# Patient Record
Sex: FEMALE | Race: WHITE | NOT HISPANIC OR LATINO | Employment: OTHER | URBAN - NONMETROPOLITAN AREA
[De-identification: names, ages, dates, MRNs, and addresses within clinical notes are randomized per-mention and may not be internally consistent; named-entity substitution may affect disease eponyms.]

---

## 2024-07-01 ENCOUNTER — HOSPITAL ENCOUNTER (EMERGENCY)
Facility: HOSPITAL | Age: 74
Discharge: HOME | End: 2024-07-01
Attending: STUDENT IN AN ORGANIZED HEALTH CARE EDUCATION/TRAINING PROGRAM | Admitting: STUDENT IN AN ORGANIZED HEALTH CARE EDUCATION/TRAINING PROGRAM
Payer: MEDICARE

## 2024-07-01 ENCOUNTER — APPOINTMENT (OUTPATIENT)
Dept: GENERAL RADIOLOGY | Facility: HOSPITAL | Age: 74
End: 2024-07-01
Payer: MEDICARE

## 2024-07-01 VITALS
OXYGEN SATURATION: 97 % | HEART RATE: 90 BPM | HEIGHT: 65 IN | BODY MASS INDEX: 44.81 KG/M2 | SYSTOLIC BLOOD PRESSURE: 136 MMHG | WEIGHT: 268.96 LBS | RESPIRATION RATE: 16 BRPM | TEMPERATURE: 98.7 F | DIASTOLIC BLOOD PRESSURE: 79 MMHG

## 2024-07-01 DIAGNOSIS — R20.2 PARESTHESIA OF LEFT ARM: Primary | ICD-10-CM

## 2024-07-01 DIAGNOSIS — I10 HYPERTENSION, UNSPECIFIED TYPE: ICD-10-CM

## 2024-07-01 DIAGNOSIS — R94.31 ABNORMAL EKG: ICD-10-CM

## 2024-07-01 LAB
ALBUMIN SERPL-MCNC: 4.2 G/DL (ref 3.5–5.2)
ALBUMIN/GLOB SERPL: 1.2 G/DL
ALP SERPL-CCNC: 123 U/L (ref 39–117)
ALT SERPL W P-5'-P-CCNC: 30 U/L (ref 1–33)
ANION GAP SERPL CALCULATED.3IONS-SCNC: 13.2 MMOL/L (ref 5–15)
AST SERPL-CCNC: 33 U/L (ref 1–32)
BASOPHILS # BLD AUTO: 0.07 10*3/MM3 (ref 0–0.2)
BASOPHILS NFR BLD AUTO: 0.8 % (ref 0–1.5)
BILIRUB SERPL-MCNC: 0.7 MG/DL (ref 0–1.2)
BUN SERPL-MCNC: 9 MG/DL (ref 8–23)
BUN/CREAT SERPL: 13.2 (ref 7–25)
CALCIUM SPEC-SCNC: 9.2 MG/DL (ref 8.6–10.5)
CHLORIDE SERPL-SCNC: 106 MMOL/L (ref 98–107)
CO2 SERPL-SCNC: 25.8 MMOL/L (ref 22–29)
CREAT SERPL-MCNC: 0.68 MG/DL (ref 0.57–1)
DEPRECATED RDW RBC AUTO: 41.9 FL (ref 37–54)
EGFRCR SERPLBLD CKD-EPI 2021: 90.4 ML/MIN/1.73
EOSINOPHIL # BLD AUTO: 0.28 10*3/MM3 (ref 0–0.4)
EOSINOPHIL NFR BLD AUTO: 3.3 % (ref 0.3–6.2)
ERYTHROCYTE [DISTWIDTH] IN BLOOD BY AUTOMATED COUNT: 14.2 % (ref 12.3–15.4)
GLOBULIN UR ELPH-MCNC: 3.5 GM/DL
GLUCOSE SERPL-MCNC: 108 MG/DL (ref 65–99)
HCT VFR BLD AUTO: 41.4 % (ref 34–46.6)
HGB BLD-MCNC: 14.2 G/DL (ref 12–15.9)
HOLD SPECIMEN: NORMAL
HOLD SPECIMEN: NORMAL
IMM GRANULOCYTES # BLD AUTO: 0.04 10*3/MM3 (ref 0–0.05)
IMM GRANULOCYTES NFR BLD AUTO: 0.5 % (ref 0–0.5)
LYMPHOCYTES # BLD AUTO: 1.58 10*3/MM3 (ref 0.7–3.1)
LYMPHOCYTES NFR BLD AUTO: 18.9 % (ref 19.6–45.3)
MAGNESIUM SERPL-MCNC: 1.9 MG/DL (ref 1.6–2.4)
MCH RBC QN AUTO: 28.1 PG (ref 26.6–33)
MCHC RBC AUTO-ENTMCNC: 34.3 G/DL (ref 31.5–35.7)
MCV RBC AUTO: 82 FL (ref 79–97)
MONOCYTES # BLD AUTO: 0.58 10*3/MM3 (ref 0.1–0.9)
MONOCYTES NFR BLD AUTO: 6.9 % (ref 5–12)
NEUTROPHILS NFR BLD AUTO: 5.83 10*3/MM3 (ref 1.7–7)
NEUTROPHILS NFR BLD AUTO: 69.6 % (ref 42.7–76)
NRBC BLD AUTO-RTO: 0 /100 WBC (ref 0–0.2)
PLATELET # BLD AUTO: 285 10*3/MM3 (ref 140–450)
PMV BLD AUTO: 9.2 FL (ref 6–12)
POTASSIUM SERPL-SCNC: 4.2 MMOL/L (ref 3.5–5.2)
PROT SERPL-MCNC: 7.7 G/DL (ref 6–8.5)
RBC # BLD AUTO: 5.05 10*6/MM3 (ref 3.77–5.28)
SODIUM SERPL-SCNC: 145 MMOL/L (ref 136–145)
TROPONIN T SERPL HS-MCNC: 17 NG/L
TROPONIN T SERPL HS-MCNC: 17 NG/L
TSH SERPL DL<=0.05 MIU/L-ACNC: 1.17 UIU/ML (ref 0.27–4.2)
WBC NRBC COR # BLD AUTO: 8.38 10*3/MM3 (ref 3.4–10.8)
WHOLE BLOOD HOLD COAG: NORMAL
WHOLE BLOOD HOLD SPECIMEN: NORMAL

## 2024-07-01 PROCEDURE — 71045 X-RAY EXAM CHEST 1 VIEW: CPT

## 2024-07-01 PROCEDURE — 84484 ASSAY OF TROPONIN QUANT: CPT | Performed by: PHYSICIAN ASSISTANT

## 2024-07-01 PROCEDURE — 36415 COLL VENOUS BLD VENIPUNCTURE: CPT

## 2024-07-01 PROCEDURE — 80053 COMPREHEN METABOLIC PANEL: CPT | Performed by: STUDENT IN AN ORGANIZED HEALTH CARE EDUCATION/TRAINING PROGRAM

## 2024-07-01 PROCEDURE — 85025 COMPLETE CBC W/AUTO DIFF WBC: CPT | Performed by: STUDENT IN AN ORGANIZED HEALTH CARE EDUCATION/TRAINING PROGRAM

## 2024-07-01 PROCEDURE — 99284 EMERGENCY DEPT VISIT MOD MDM: CPT

## 2024-07-01 PROCEDURE — 93005 ELECTROCARDIOGRAM TRACING: CPT | Performed by: STUDENT IN AN ORGANIZED HEALTH CARE EDUCATION/TRAINING PROGRAM

## 2024-07-01 PROCEDURE — 84484 ASSAY OF TROPONIN QUANT: CPT | Performed by: STUDENT IN AN ORGANIZED HEALTH CARE EDUCATION/TRAINING PROGRAM

## 2024-07-01 PROCEDURE — 83735 ASSAY OF MAGNESIUM: CPT | Performed by: STUDENT IN AN ORGANIZED HEALTH CARE EDUCATION/TRAINING PROGRAM

## 2024-07-01 PROCEDURE — 84443 ASSAY THYROID STIM HORMONE: CPT | Performed by: STUDENT IN AN ORGANIZED HEALTH CARE EDUCATION/TRAINING PROGRAM

## 2024-07-01 RX ORDER — LOSARTAN POTASSIUM 100 MG/1
100 TABLET ORAL DAILY
COMMUNITY

## 2024-07-01 RX ORDER — SODIUM CHLORIDE 0.9 % (FLUSH) 0.9 %
10 SYRINGE (ML) INJECTION AS NEEDED
Status: DISCONTINUED | OUTPATIENT
Start: 2024-07-01 | End: 2024-07-01 | Stop reason: HOSPADM

## 2024-07-01 NOTE — ED PROVIDER NOTES
Subjective:  Chief Complaint:  A Fib    History of Present Illness:  Patient is a 74-year-old female presenting to the ER with complaints of A-fib.  Patient states that she has had some numbness and tingling to her left arm and has been seeing her PCP for this.  States that she has had some issues with her blood pressure and discussed that her losartan had been decreased by a nurse that she saw at a primary care office.  She states that she thought this was cavalier to decrease this as she states she been on it for years and believes that it was either prescribed by her PCP or a cardiologist that she seen before.  States that she has seen cardiology and had stress test and workup performed previously and everything was good.  She states that she has been concerned about her blood pressure and has been calling around to different cardiologist to get an appointment.  States that her blood pressures been high at other offices but not her PCP office and they have refused to increase her dose.  States she saw a new PCP today who she states was a doctor and she states that the doctor did not want to get an EKG but she insisted.  She states that to her shock she was in A-fib.  Denies any history of A-fib.  States that she saw a cardiologist in the past because of something her brother had been diagnosed with.  States that they started her on a statin prophylactically but she quit taking that as she states her cholesterol is normal and she thought the benefits did not outweigh the risks of side effects.  She denies any symptoms at this time, appears comfortable and eating peanut butter crackers during interview.      Nurses Notes reviewed and agree, including vitals, allergies, social history and prior medical history.     REVIEW OF SYSTEMS: All systems reviewed and not pertinent unless noted.  Review of Systems   Cardiovascular:         Reports Afib diagnosed at PCP office   Neurological:  Positive for numbness (LUE).  "  All other systems reviewed and are negative.      History reviewed. No pertinent past medical history.    Allergies:    Chlorpheniramine, Sulfate, Hydrocodone, and Morphine      History reviewed. No pertinent surgical history.      Social History     Socioeconomic History    Marital status:          History reviewed. No pertinent family history.    Objective  Physical Exam:  /79 (BP Location: Left leg)   Pulse 90   Temp 98.7 °F (37.1 °C) (Oral)   Resp 16   Ht 165.1 cm (65\")   Wt 122 kg (268 lb 15.4 oz)   SpO2 97%   BMI 44.76 kg/m²      Physical Exam  Vitals and nursing note reviewed.   Constitutional:       General: She is not in acute distress.     Appearance: She is not toxic-appearing.   HENT:      Head: Normocephalic and atraumatic.      Right Ear: External ear normal.      Left Ear: External ear normal.      Nose: Nose normal.   Eyes:      Extraocular Movements: Extraocular movements intact.      Conjunctiva/sclera: Conjunctivae normal.   Cardiovascular:      Rate and Rhythm: Normal rate.   Pulmonary:      Effort: Pulmonary effort is normal. No respiratory distress.   Abdominal:      General: There is no distension.   Musculoskeletal:         General: Normal range of motion.      Cervical back: Normal range of motion and neck supple.   Skin:     General: Skin is warm and dry.   Neurological:      General: No focal deficit present.      Mental Status: She is alert and oriented to person, place, and time.   Psychiatric:         Mood and Affect: Mood normal.         Behavior: Behavior normal.         Procedures    ED Course:         Lab Results (last 24 hours)       Procedure Component Value Units Date/Time    CBC & Differential [[id number]]  (Abnormal) Collected: 07/01/24 1405    Specimen: Blood Updated: 07/01/24 1411    Narrative:      The following orders were created for panel order CBC & Differential.  Procedure                               Abnormality         Status                   "   ---------                               -----------         ------                     CBC Auto Differential[[id number]]        Abnormal            Final result                 Please view results for these tests on the individual orders.    Comprehensive Metabolic Panel [[id number]]  (Abnormal) Collected: 07/01/24 1405    Specimen: Blood Updated: 07/01/24 1442     Glucose 108 mg/dL      BUN 9 mg/dL      Creatinine 0.68 mg/dL      Sodium 145 mmol/L      Potassium 4.2 mmol/L      Chloride 106 mmol/L      CO2 25.8 mmol/L      Calcium 9.2 mg/dL      Total Protein 7.7 g/dL      Albumin 4.2 g/dL      ALT (SGPT) 30 U/L      AST (SGOT) 33 U/L      Alkaline Phosphatase 123 U/L      Total Bilirubin 0.7 mg/dL      Globulin 3.5 gm/dL      A/G Ratio 1.2 g/dL      BUN/Creatinine Ratio 13.2     Anion Gap 13.2 mmol/L      eGFR 90.4 mL/min/1.73     Narrative:      GFR Normal >60  Chronic Kidney Disease <60  Kidney Failure <15    The GFR formula is only valid for adults with stable renal function between ages 18 and 70.    Magnesium [[id number]]  (Normal) Collected: 07/01/24 1405    Specimen: Blood Updated: 07/01/24 1442     Magnesium 1.9 mg/dL     Single High Sensitivity Troponin T [[id number]]  (Abnormal) Collected: 07/01/24 1405    Specimen: Blood Updated: 07/01/24 1455     HS Troponin T 17 ng/L     Narrative:      High Sensitive Troponin T Reference Range:  <14.0 ng/L- Negative Female for AMI  <22.0 ng/L- Negative Male for AMI  >=14 - Abnormal Female indicating possible myocardial injury.  >=22 - Abnormal Male indicating possible myocardial injury.   Clinicians would have to utilize clinical acumen, EKG, Troponin, and serial changes to determine if it is an Acute Myocardial Infarction or myocardial injury due to an underlying chronic condition.         TSH [[id number]]  (Normal) Collected: 07/01/24 1405    Specimen: Blood Updated: 07/01/24 1455     TSH 1.170 uIU/mL     CBC Auto Differential [[id number]]  (Abnormal) Collected:  07/01/24 1405    Specimen: Blood Updated: 07/01/24 1411     WBC 8.38 10*3/mm3      RBC 5.05 10*6/mm3      Hemoglobin 14.2 g/dL      Hematocrit 41.4 %      MCV 82.0 fL      MCH 28.1 pg      MCHC 34.3 g/dL      RDW 14.2 %      RDW-SD 41.9 fl      MPV 9.2 fL      Platelets 285 10*3/mm3      Neutrophil % 69.6 %      Lymphocyte % 18.9 %      Monocyte % 6.9 %      Eosinophil % 3.3 %      Basophil % 0.8 %      Immature Grans % 0.5 %      Neutrophils, Absolute 5.83 10*3/mm3      Lymphocytes, Absolute 1.58 10*3/mm3      Monocytes, Absolute 0.58 10*3/mm3      Eosinophils, Absolute 0.28 10*3/mm3      Basophils, Absolute 0.07 10*3/mm3      Immature Grans, Absolute 0.04 10*3/mm3      nRBC 0.0 /100 WBC     Single High Sensitivity Troponin T [[id number]]  (Abnormal) Collected: 07/01/24 1559    Specimen: Blood Updated: 07/01/24 1625     HS Troponin T 17 ng/L     Narrative:      High Sensitive Troponin T Reference Range:  <14.0 ng/L- Negative Female for AMI  <22.0 ng/L- Negative Male for AMI  >=14 - Abnormal Female indicating possible myocardial injury.  >=22 - Abnormal Male indicating possible myocardial injury.   Clinicians would have to utilize clinical acumen, EKG, Troponin, and serial changes to determine if it is an Acute Myocardial Infarction or myocardial injury due to an underlying chronic condition.                  XR Chest 1 View    Result Date: 7/1/2024  PROCEDURE: XR CHEST 1 VW-  HISTORY: Dysrhythmia Triage Protocol  COMPARISON: None.  FINDINGS: Apical lordotic positioning. The heart is mildly enlarged. The thoracic aorta is tortuous. The mediastinum is unremarkable. There is mild elevation of the right hemidiaphragm. There is no pneumothorax. There are no acute osseous abnormalities.      Impression: Cardiomegaly without acute infiltrate.     Images were reviewed, interpreted, and dictated by Dr. Alicia Parnell MD Transcribed by Victor M Harris PA-C.  This report was signed and finalized on 7/1/2024 2:57 PM by Alicia Parnell  MD.          ALVA  Patient evaluated in the ER for A-fib discovered at PCPs office when she insisted that the PCP ordered an EKG.  She has been having some left upper extremity numbness/tingling.  She is hypertensive but otherwise hemodynamically stable, afebrile, nontoxic-appearing on exam.  Differential diagnosis includes but is not limited to A-fib, ACS, other cardiac arrhythmia, electrolyte abnormalities, among others.  Initial plan includes CBC, CMP, magnesium, troponin, TSH, EKG, chest x-ray.    Labs are nonactionable.  Troponin stable at 17.  EKG normal sinus rhythm with no acute ischemic changes.  Reviewed EKGs from PCP office with ED attending. Questionable A Fib. Not high quality EKG. Chest x-ray does show cardiomegaly without any acute infiltrates.  Discussed with patient that workup in ER is reassuring but this does not rule out paroxysmal arrhythmia or CHF or an arrhythmia.  Advised the patient that she would benefit from further cardiology workup and evaluation such as Holter monitor or echo.  Referral was placed for Dr. Harkins, cardiologist.  Recommended keeping a blood pressure log as well and following up with PCP for further outpatient evaluation.  Precautions were given for return to the ER for any new or worsening symptoms.    Final diagnoses:   Paresthesia of left arm   Hypertension, unspecified type   Abnormal EKG          Allyson Maddox PA-C  07/01/24 6025

## 2024-07-01 NOTE — DISCHARGE INSTRUCTIONS
Your troponins (cardiac enzymes) were stable at 17. EKG was sinus rhythm here with no acute ischemic changes. Your chest X-ray did show cardiomegaly. Referral has been placed for Dr. Harkins, Cardiologist.  Follow-up with him for further outpatient evaluation and definitive care of abnormal EKG at PCP's office and hypertension.  Return to the ER for new or worsening symptoms or acute concerns.

## 2024-07-02 ENCOUNTER — OFFICE VISIT (OUTPATIENT)
Dept: CARDIOLOGY | Facility: CLINIC | Age: 74
End: 2024-07-02
Payer: MEDICARE

## 2024-07-02 VITALS
HEART RATE: 78 BPM | BODY MASS INDEX: 44.65 KG/M2 | WEIGHT: 268 LBS | OXYGEN SATURATION: 96 % | DIASTOLIC BLOOD PRESSURE: 82 MMHG | HEIGHT: 65 IN | RESPIRATION RATE: 18 BRPM | SYSTOLIC BLOOD PRESSURE: 132 MMHG

## 2024-07-02 DIAGNOSIS — I48.91 ATRIAL FIBRILLATION, UNSPECIFIED TYPE: ICD-10-CM

## 2024-07-02 DIAGNOSIS — E66.01 MORBID OBESITY WITH BMI OF 40.0-44.9, ADULT: ICD-10-CM

## 2024-07-02 DIAGNOSIS — L97.909 CHRONIC CUTANEOUS VENOUS STASIS ULCER: ICD-10-CM

## 2024-07-02 DIAGNOSIS — I10 ESSENTIAL HYPERTENSION: Primary | ICD-10-CM

## 2024-07-02 DIAGNOSIS — I83.009 CHRONIC CUTANEOUS VENOUS STASIS ULCER: ICD-10-CM

## 2024-07-02 DIAGNOSIS — R94.31 ABNORMAL EKG: ICD-10-CM

## 2024-07-02 RX ORDER — LEVOTHYROXINE SODIUM 0.05 MG/1
50 TABLET ORAL DAILY
COMMUNITY

## 2024-07-02 RX ORDER — EVENING PRIMROSE OIL 500 MG
100 CAPSULE ORAL DAILY
COMMUNITY

## 2024-07-02 RX ORDER — MULTIVITAMIN
1 CAPSULE ORAL DAILY
COMMUNITY

## 2024-07-02 RX ORDER — ACYCLOVIR 200 MG/1
200 CAPSULE ORAL 2 TIMES DAILY
COMMUNITY

## 2024-07-02 RX ORDER — HYDRALAZINE HYDROCHLORIDE 10 MG/1
10 TABLET, FILM COATED ORAL 2 TIMES DAILY
COMMUNITY
Start: 2024-04-26

## 2024-07-02 NOTE — PROGRESS NOTES
[facility] Cardiology    Office Consult     Gloria D Shone  [id number]  07/02/2024    Referred By: No ref. provider found    Chief Complaint   Patient presents with    Atrial Fibrillation    Left arm numbness       Subjective     History of Present Illness:   Gloria D Shone is a 74 y.o. female who presents to the Cardiology Clinic for evaluation after an emergency department visit yesterday evening.  Patient presented to the emergency department after seeing her PCP who obtained an EKG noting atrial fibrillation per her report.  Patient with past health history significant for hypertension, venous insufficiency and venous ulcers, and lymphedema.  At time of exam today, patient tells me that her Losartan dose was previously 200mg daily and her PCP cut that back to 100mg daily even though her cardiologist had prescribed it in the past.  Patient states that her blood pressure had been high since then.  She was recently started on Hydralazine 10mg BID in addition to Losartan 100mg in April and states she does not think it is doing anything.  She is followed by a wound clinic in [place] for venous stasis ulcers to right leg and states her blood pressure has been high at those visits in the 170s systolic.  She notes that she does have nerve damage and it is painful when she checks her blood pressure.  I discussed with her that her sleep doctor appointment in March the blood pressure was documented at 120/64 and she advised it was high at that appointment.  Her blood pressure in the emergency department yesterday and in the office today was in 130s/80s.  We discussed that she would need to check her blood pressures a couple times/ week and keep a log to bring back to follow up prior to any medication adjustments.  She states that she asked for an EKG in the PCP office due to her high blood pressure and numbness in bilateral arms (which has been documented to be followed by her PCP) which she suspected  was putting her in atrial fibrillation.  She states her PCP told her she was in atrial fibrillation and she needed to go to the ED.  She denies any chest pain, shortness of breath, or palpitations at any time.  She has chronic lower extremity edema due to venous stasis and lymphedema.      Past Cardiac Testing: None on file      Review of Systems   Constitutional:  Negative for activity change and fatigue.   Respiratory:  Negative for chest tightness and shortness of breath.    Cardiovascular:  Positive for leg swelling. Negative for chest pain and palpitations.   Neurological:  Negative for dizziness.   All other systems reviewed and are negative.       Past Medical History:   Diagnosis Date    Breast cancer     Hypertension     Hypothyroidism     Sleep apnea        Past Surgical History:   Procedure Laterality Date    BREAST LUMPECTOMY      CERVICAL FUSION      CHOLECYSTECTOMY      FINGER TENDON REPAIR      HYSTERECTOMY      REPLACEMENT TOTAL KNEE BILATERAL      TOE TENDON REPAIR      TONSILLECTOMY      TOTAL HIP ARTHROPLASTY         Family History   Problem Relation Age of Onset    Kidney failure Mother     Heart disease Father     Heart attack Father     Heart attack Paternal Uncle     Heart attack Maternal Grandfather     Heart attack Paternal Grandfather        Social History     Socioeconomic History    Marital status:    Tobacco Use    Smoking status: Never   Vaping Use    Vaping status: Never Used   Substance and Sexual Activity    Alcohol use: Not Currently     Comment: social    Drug use: Never    Sexual activity: Defer         Current Outpatient Medications:     acyclovir (ZOVIRAX) 200 MG capsule, Take 1 capsule by mouth 2 (Two) Times a Day., Disp: , Rfl:     CALCIUM PO, Take  by mouth., Disp: , Rfl:     Coenzyme Q10 (COQ-10 PO), Take  by mouth., Disp: , Rfl:     hydrALAZINE (APRESOLINE) 10 MG tablet, Take 1 tablet by mouth 2 (Two) Times a Day., Disp: , Rfl:     levothyroxine (SYNTHROID,  "LEVOTHROID) 50 MCG tablet, Take 1 tablet by mouth Daily., Disp: , Rfl:     losartan (COZAAR) 100 MG tablet, Take 1 tablet by mouth Daily., Disp: , Rfl:     MAGNESIUM ASPARTATE PO, Take  by mouth., Disp: , Rfl:     Multiple Vitamin (multivitamin) capsule, Take 1 capsule by mouth Daily., Disp: , Rfl:     Multiple Vitamins-Minerals (b complex-C-E-zinc) tablet, Take 1 tablet by mouth Daily., Disp: , Rfl:     mupirocin (BACTROBAN) 2 % ointment, Apply 1 Application topically to the appropriate area as directed., Disp: , Rfl:     Vitamin E 45 MG (100 UNIT) capsule, Take 1 capsule by mouth Daily., Disp: , Rfl:   No current facility-administered medications for this visit.      Allergies   Allergen Reactions    Chlorpheniramine Hives and Other (See Comments)    Sulfa Antibiotics Nausea Only     Other reaction(s): Nausea and/or Vomiting   vomiting   reviewed in [facility]   GI UPSET    Sulfate Nausea Only     Other reaction(s): Nausea and/or Vomiting   vomiting   reviewed in [facility]   Other reaction(s): Nausea and/or Vomiting   vomiting   reviewed in [facility]   GI UPSET    GI UPSET    Other reaction(s): Nausea and/or Vomiting   vomiting   reviewed in [facility]   GI UPSET     Other reaction(s): Nausea and/or Vomiting   vomiting   reviewed in [facility]   GI UPSET    Hydrocodone Cough    Morphine Nausea And Vomiting and Other (See Comments)     [HC:2003-03-27]   Other reaction(s): Nausea and/or Vomiting   [HC:2003-03-27]    Other reaction(s): Nausea and/or Vomiting   [HC:2003-03-27]       Objective     Vitals:    07/02/24 1057   BP: 132/82   BP Location: Right arm   Patient Position: Sitting   Cuff Size: Adult   Pulse: 78   Resp: 18   SpO2: 96%   Weight: 122 kg (268 lb)   Height: 165.1 cm (65\")     Body mass index is 44.6 kg/m².    Physical Exam  Vitals and nursing note reviewed.   Constitutional:       General: She is not in acute distress.     Appearance: Normal appearance. She is obese. She is not toxic-appearing.   HENT:      Head: Normocephalic.      " Mouth/Throat:      Mouth: Mucous membranes are moist.   Eyes:      Pupils: Pupils are equal, round, and reactive to light.   Cardiovascular:      Rate and Rhythm: Normal rate and regular rhythm.      Pulses: Normal pulses.      Heart sounds: Normal heart sounds. No murmur heard.  Pulmonary:      Effort: Pulmonary effort is normal.      Breath sounds: Normal breath sounds. No wheezing, rhonchi or rales.   Musculoskeletal:      Right lower leg: Edema present.      Left lower leg: Edema present.      Comments: Chronic lower extremity edema  Right leg wrapped with venous stasis ulcer currently being treated   Skin:     General: Skin is warm and dry.      Capillary Refill: Capillary refill takes less than 2 seconds.   Neurological:      Mental Status: She is alert and oriented to person, place, and time. Mental status is at baseline.   Psychiatric:         Mood and Affect: Mood normal.         Behavior: Behavior normal.         Thought Content: Thought content normal.         Results Review:   I reviewed the patient's new clinical results.  I reviewed the patient's other test results and agree with the interpretation      ECG 12 Lead    Date/Time: 7/2/2024 12:34 PM  Performed by: Martha Khan APRN    Authorized by: Martha Khan APRN  Comparison: compared with previous ECG from 7/1/2024  Similar to previous ECG  Rhythm: sinus rhythm and sinus arrhythmia  Rate: normal    Clinical impression: normal ECG          Assessment & Plan  Abnormal EKG  -Requesting records from PCP office including EKG which patient was told read atrial fibrillation.  -EKG in office today normal sinus rhythm with sinus arrhythmia.  -As patient is asymptomatic, will place MCOT to assess for possible atrial fibrillation for 30 days.    Essential hypertension  -Currently on Losartan 100mg and Hydralazine 10mg BID  -Blood pressure controlled today  -Discussed keeping blood pressure log to bring to follow up and to call the office if blood  pressures consistently run >150 systolic and we would consider uptitrating Hydralazine at that time.  -Requesting wound clinic visits for blood pressure readings as well    Morbid obesity with BMI of 40.0-44.9, adult  -Diet and exercise recommended for weight loss    Atrial fibrillation, unspecified type  -Was told she was in Atrial Fibrillation by PCP office.  Records requested.  ED noted questionable atrial fibrillation on their review.  -MCOT to assess for atrial fibrillation for 30 days  -Echo ordered to assess for structural abnormalities and systolic/ diastolic function    Chronic cutaneous venous stasis ulcer  -Being followed by wound clinic      Orders Placed This Encounter   Procedures    Mobile Cardiac Outpatient Telemetry    Adult Transthoracic Echo Complete W/ Cont if Necessary Per Protocol          Preventative Cardiology:   Tobacco Cessation: N/A   Advance Care Planning: ACP discussion was held with the patient during this visit. Patient does not have an advance directive, declines further assistance.     Follow Up:   Return in about 6 weeks (around 8/13/2024) for discussion of test results and reassess blood pressure.      Thank you for allowing me to participate in the care of your patient. Please to not hesitate to contact me with additional questions or concerns.     MORA Montes

## 2024-08-05 ENCOUNTER — TELEPHONE (OUTPATIENT)
Dept: CARDIOLOGY | Facility: CLINIC | Age: 74
End: 2024-08-05

## 2024-08-05 NOTE — TELEPHONE ENCOUNTER
Hub staff attempted to follow warm transfer process and was unsuccessful     Caller: Shone, Gloria D    Relationship to patient: Self    Best call back number: [phone]     Patient is needing: PT STATES SHE MISSED A CALL, NO NOTE IN CHART BUT APPT NOTES WERE CHANGED ON 8/5.     STATES IF THIS TEST IS CAN SHE WILL NOT BE ABLE TO COME IN UNTIL NEXT YEAR, PLEASE ADVISE IF THIS IS SOMETHING SHE CAN KEEP ON THIS DATE. PT HAS APPT ON 8/15 WITH CARD FOR TESTING F/U

## 2024-08-09 ENCOUNTER — TELEPHONE (OUTPATIENT)
Dept: CARDIOLOGY | Facility: CLINIC | Age: 74
End: 2024-08-09

## 2024-08-09 NOTE — TELEPHONE ENCOUNTER
Caller: Shone, Gloria D    Relationship: Self    Best call back number: [phone]     What is the best time to reach you: ASAP     What was the call regarding: PT IS NEEDING A NUMBER FOR THE COMPANY THAT DID HER HEART MONITOR, STATES SOMETHING HAS HAPPENED IN MAILING PROCESS. STATES THIS IS URGENT. WILL BE CALLING BACK IN 10 MINS IF NO ONE CALLS HER.     Is it okay if the provider responds through MyChart: CALL

## 2024-08-15 ENCOUNTER — OFFICE VISIT (OUTPATIENT)
Dept: CARDIOLOGY | Facility: CLINIC | Age: 74
End: 2024-08-15
Payer: MEDICARE

## 2024-08-15 VITALS
OXYGEN SATURATION: 95 % | DIASTOLIC BLOOD PRESSURE: 70 MMHG | HEIGHT: 65 IN | BODY MASS INDEX: 44.65 KG/M2 | WEIGHT: 268 LBS | SYSTOLIC BLOOD PRESSURE: 120 MMHG | HEART RATE: 72 BPM

## 2024-08-15 DIAGNOSIS — E66.01 MORBID OBESITY WITH BMI OF 40.0-44.9, ADULT: ICD-10-CM

## 2024-08-15 DIAGNOSIS — R94.31 ABNORMAL EKG: ICD-10-CM

## 2024-08-15 DIAGNOSIS — I10 ESSENTIAL HYPERTENSION: Primary | ICD-10-CM

## 2024-08-15 PROCEDURE — 99213 OFFICE O/P EST LOW 20 MIN: CPT | Performed by: INTERNAL MEDICINE

## 2024-08-15 NOTE — PROGRESS NOTES
[facility] Cardiology Office Follow Up Note    Gloria D Shone  [id number]  08/15/2024    Primary Care Provider: Mohsen Colunga MD    Chief Complaint: Routine follow-up    History of Present Illness:   Mrs. Gloria D Shone is a 74 y.o. female who presents to the Cardiology Clinic for continued follow-up.  The patient initially presented to cardiology clinic for evaluation of potential atrial fibrillation noted on prior ECG in .  On review of the ECG, there was no definitive evidence of atrial fibrillation.  She subsequently underwent outpatient cardiac monitoring, with no evidence of PAF.  Today, the patient reports he continues to do well from a cardiac standpoint.  She has not had any episodes of palpitations.  No chest pain or chest discomfort.  She does report her blood pressure is continuously fluctuating.  No other specific complaints today.    Past Cardiac Testin. Last Coronary Angio: None  2. Prior Stress Testing: None  3. Last Echo: 2024  1.  Normal left ventricular size and systolic function, LVEF 60-65%.  2.  Normal LV diastolic filling pattern.  3.  Normal right ventricular size and systolic function.  4.  Normal left atrial volume index.  5.  No significant valvular abnormalities.  4. Prior Holter Monitor: [facility] [place]    1.  No evidence of atrial fibrillation    Review of Systems:   Review of Systems   Constitutional:  Negative for activity change, appetite change, chills, diaphoresis, fatigue, fever, unexpected weight gain and unexpected weight loss.   Eyes:  Negative for blurred vision and double vision.   Respiratory:  Negative for cough, chest tightness, shortness of breath and wheezing.    Cardiovascular:  Negative for chest pain, palpitations and leg swelling.   Gastrointestinal:  Negative for abdominal pain, anal bleeding, blood in stool and GERD.   Endocrine: Negative for cold intolerance and heat intolerance.   Genitourinary:  Negative for hematuria.  "  Neurological:  Negative for dizziness, syncope, weakness and light-headedness.   Hematological:  Does not bruise/bleed easily.   Psychiatric/Behavioral:  Negative for depressed mood and stress. The patient is not nervous/anxious.        I have reviewed and/or updated the patient's past medical, past surgical, family, social history, problem list and allergies as appropriate.     Medications:     Current Outpatient Medications:     acyclovir (ZOVIRAX) 200 MG capsule, Take 1 capsule by mouth 2 (Two) Times a Day., Disp: , Rfl:     CALCIUM PO, Take  by mouth., Disp: , Rfl:     Coenzyme Q10 (COQ-10 PO), Take  by mouth., Disp: , Rfl:     hydrALAZINE (APRESOLINE) 10 MG tablet, Take 1 tablet by mouth 2 (Two) Times a Day., Disp: , Rfl:     levothyroxine (SYNTHROID, LEVOTHROID) 50 MCG tablet, Take 1 tablet by mouth Daily., Disp: , Rfl:     losartan (COZAAR) 100 MG tablet, Take 1 tablet by mouth Daily., Disp: , Rfl:     MAGNESIUM ASPARTATE PO, Take  by mouth., Disp: , Rfl:     Multiple Vitamin (multivitamin) capsule, Take 1 capsule by mouth Daily., Disp: , Rfl:     Multiple Vitamins-Minerals (b complex-C-E-zinc) tablet, Take 1 tablet by mouth Daily., Disp: , Rfl:     mupirocin (BACTROBAN) 2 % ointment, Apply 1 Application topically to the appropriate area as directed., Disp: , Rfl:     NON FORMULARY, by Other route. Pt states she is taking D-LIMONENE FLAVOR, Disp: , Rfl:     Vitamin E 45 MG (100 UNIT) capsule, Take 1 capsule by mouth Daily., Disp: , Rfl:     Physical Exam:  Vital Signs:   Vitals:    08/15/24 1433   BP: 120/70   BP Location: Right arm   Patient Position: Sitting   Cuff Size: Adult   Pulse: 72   SpO2: 95%   Weight: 122 kg (268 lb)   Height: 165.1 cm (65\")       Physical Exam  Constitutional:       General: She is not in acute distress.     Appearance: She is well-developed. She is obese. She is not diaphoretic.   HENT:      Head: Normocephalic and atraumatic.   Eyes:      General: No scleral icterus.     " Pupils: Pupils are equal, round, and reactive to light.   Neck:      Trachea: No tracheal deviation.   Cardiovascular:      Rate and Rhythm: Normal rate and regular rhythm.      Heart sounds: Normal heart sounds. No murmur heard.     No friction rub. No gallop.      Comments: Normal JVD.  Pulmonary:      Effort: Pulmonary effort is normal. No respiratory distress.      Breath sounds: Normal breath sounds. No stridor. No wheezing or rales.   Chest:      Chest wall: No tenderness.   Abdominal:      General: Bowel sounds are normal. There is no distension.      Palpations: Abdomen is soft.      Tenderness: There is no abdominal tenderness. There is no guarding or rebound.   Musculoskeletal:         General: No swelling. Normal range of motion.      Cervical back: Neck supple. No tenderness.   Lymphadenopathy:      Cervical: No cervical adenopathy.   Skin:     General: Skin is warm and dry.      Findings: No erythema.   Neurological:      General: No focal deficit present.      Mental Status: She is alert and oriented to person, place, and time.   Psychiatric:         Mood and Affect: Mood normal.         Behavior: Behavior normal.         Results Review:   I reviewed the patient's new clinical results.        Assessment / Plan:     1.  Possible atrial fibrillation  -- On review of prior ECGs, no definitive evidence of atrial fibrillation  -- Underwent outpatient cardiac monitoring, with no evidence of PAF  -- No palpitations  -- Echocardiogram shows normal LV systolic function without significant structural valvular abnormalities  -- Given unremarkable cardiac workup, will follow on an as-needed basis    2.  Hypertension  -- BP controlled today  -- Advised to keep home BP log and bring to her next appointment with PCP    3.  Morbid obesity, BMI 44  -- Weight loss advised    Follow Up:   No follow-ups on file.      Thank you for allowing me to participate in the care of your patient. Please to not hesitate to contact me  with additional questions or concerns.     EUGENIA Urbina MD  Interventional Cardiology   08/15/2024  14:46 EDT

## 2024-08-20 ENCOUNTER — OFFICE VISIT (OUTPATIENT)
Dept: FAMILY MEDICINE CLINIC | Facility: CLINIC | Age: 74
End: 2024-08-20
Payer: MEDICARE

## 2024-08-20 VITALS
DIASTOLIC BLOOD PRESSURE: 80 MMHG | HEIGHT: 65 IN | TEMPERATURE: 97 F | BODY MASS INDEX: 45.02 KG/M2 | WEIGHT: 270.2 LBS | RESPIRATION RATE: 16 BRPM | HEART RATE: 57 BPM | OXYGEN SATURATION: 95 % | SYSTOLIC BLOOD PRESSURE: 120 MMHG

## 2024-08-20 DIAGNOSIS — E66.01 CLASS 3 SEVERE OBESITY DUE TO EXCESS CALORIES WITH SERIOUS COMORBIDITY AND BODY MASS INDEX (BMI) OF 40.0 TO 44.9 IN ADULT: ICD-10-CM

## 2024-08-20 DIAGNOSIS — E78.5 DYSLIPIDEMIA: ICD-10-CM

## 2024-08-20 DIAGNOSIS — E55.9 VITAMIN D DEFICIENCY: ICD-10-CM

## 2024-08-20 DIAGNOSIS — K52.9 CHRONIC DIARRHEA: ICD-10-CM

## 2024-08-20 DIAGNOSIS — I10 PRIMARY HYPERTENSION: Primary | ICD-10-CM

## 2024-08-20 DIAGNOSIS — E03.9 HYPOTHYROIDISM, UNSPECIFIED TYPE: ICD-10-CM

## 2024-08-20 PROCEDURE — G2211 COMPLEX E/M VISIT ADD ON: HCPCS | Performed by: FAMILY MEDICINE

## 2024-08-20 PROCEDURE — 3074F SYST BP LT 130 MM HG: CPT | Performed by: FAMILY MEDICINE

## 2024-08-20 PROCEDURE — 1160F RVW MEDS BY RX/DR IN RCRD: CPT | Performed by: FAMILY MEDICINE

## 2024-08-20 PROCEDURE — 99205 OFFICE O/P NEW HI 60 MIN: CPT | Performed by: FAMILY MEDICINE

## 2024-08-20 PROCEDURE — 1159F MED LIST DOCD IN RCRD: CPT | Performed by: FAMILY MEDICINE

## 2024-08-20 PROCEDURE — 3079F DIAST BP 80-89 MM HG: CPT | Performed by: FAMILY MEDICINE

## 2024-08-20 RX ORDER — AMLODIPINE BESYLATE 5 MG/1
5 TABLET ORAL DAILY
Qty: 30 TABLET | Refills: 0 | Status: SHIPPED | OUTPATIENT
Start: 2024-08-20

## 2024-08-20 RX ORDER — LOPERAMIDE HYDROCHLORIDE 2 MG/1
2 CAPSULE ORAL 4 TIMES DAILY PRN
COMMUNITY

## 2024-08-20 RX ORDER — DICYCLOMINE HCL 20 MG
20 TABLET ORAL EVERY 6 HOURS
COMMUNITY

## 2024-08-20 NOTE — PATIENT INSTRUCTIONS
Stop the hydralazine.     Start amlodipine 5 mg daily.    Change how you take dicyclomine to 4 times per day.   At morning time  At lunch time  At dinner time  Before bed

## 2024-08-22 PROBLEM — E55.9 VITAMIN D DEFICIENCY: Status: ACTIVE | Noted: 2024-08-22

## 2024-08-22 PROBLEM — E66.813 CLASS 3 SEVERE OBESITY DUE TO EXCESS CALORIES WITH SERIOUS COMORBIDITY AND BODY MASS INDEX (BMI) OF 40.0 TO 44.9 IN ADULT: Status: ACTIVE | Noted: 2024-08-22

## 2024-08-22 PROBLEM — E03.9 HYPOTHYROIDISM: Status: ACTIVE | Noted: 2024-08-22

## 2024-08-22 PROBLEM — E78.5 DYSLIPIDEMIA: Status: ACTIVE | Noted: 2024-08-22

## 2024-08-22 PROBLEM — E66.01 CLASS 3 SEVERE OBESITY DUE TO EXCESS CALORIES WITH SERIOUS COMORBIDITY AND BODY MASS INDEX (BMI) OF 40.0 TO 44.9 IN ADULT: Status: ACTIVE | Noted: 2024-08-22

## 2024-08-22 PROBLEM — I10 PRIMARY HYPERTENSION: Status: ACTIVE | Noted: 2024-08-22

## 2024-08-22 PROBLEM — K52.9 CHRONIC DIARRHEA: Status: ACTIVE | Noted: 2024-08-22

## 2024-08-22 NOTE — PROGRESS NOTES
"    Office Note     Name: Gloria D Shone  : 1950   MRN: [id number]     Chief Complaint:  Establish Care (Pt needs blood work. [facility] and others go to [facility] )    Subjective     History of Present Illness:  Gloria D Shone is a 74 y.o. female who presents today for care.  Her main concern today is chronic diarrhea.  She reports that every day she will get up make her coffee and then have bowel movement that is loose.  She reports that she will have 1-2 bowel movements within the next hour.  She states that when she has a bowel movement she feels like she is completely evacuated but will still need to return for additional bowel movements afterwards.  No blood or mucus in stools.  Rarely has bowel movement formed.  She states that this has been going on for a number of years and she has learned to live with it but she has concerns about medication to help with it.  She states that there was some workup done previously but her understanding of the diagnosis after colonoscopy was about her diarrhea was caused by diverticulitis.  She states she was given laxatives and they exacerbated her issue so she quit taking them.  She has tried loperamide but only 1-2 tablets in a day and this helps somewhat but then diarrhea restarts within 48 hours.  Currently taking dicyclomine 3 times daily but she has not been consistent with this either.  Denies abdominal pain, nausea, vomiting, fever, chills, chest pain, shortness of breath.    Patient also has issues with hypertension.  Currently on losartan 100 mg and hydralazine 10 mg twice daily.  She states that her blood pressure is typically 140s-150s/80s-90s.  Denies chest pain, back pain, shoulder pain, shortness of breath, vision changes, headaches.  She reports that she has only been tried on \"water pill\" otherwise for blood pressure and she did not respond well to that.  She states that otherwise she feels like she is healthy.  She does " "need her medicines to go to mail order pharmacy for 3-month supply.    I have reviewed the following portions of the patient's history and these were updated and discussed with the patient as appropriate: past family history, past medical history, past social history, past surgical history, and problem list.     Objective     Vital Signs  /80   Pulse 57   Temp 97 °F (36.1 °C) (Temporal)   Resp 16   Ht 165.1 cm (65\")   Wt 123 kg (270 lb 3.2 oz)   SpO2 95%   BMI 44.96 kg/m²   Estimated body mass index is 44.96 kg/m² as calculated from the following:    Height as of this encounter: 165.1 cm (65\").    Weight as of this encounter: 123 kg (270 lb 3.2 oz).    Physical Exam  Vitals reviewed.   Constitutional:       General: She is not in acute distress.     Appearance: Normal appearance. She is morbidly obese. She is not ill-appearing.   HENT:      Head: Normocephalic.   Eyes:      Extraocular Movements: Extraocular movements intact.   Cardiovascular:      Rate and Rhythm: Normal rate and regular rhythm.      Heart sounds: Normal heart sounds. No murmur heard.  Pulmonary:      Effort: Pulmonary effort is normal. No respiratory distress.      Breath sounds: Normal breath sounds. No stridor. No wheezing, rhonchi or rales.   Chest:      Chest wall: No tenderness.   Abdominal:      General: There is no distension.      Palpations: Abdomen is soft. There is no mass.      Tenderness: There is no abdominal tenderness. There is no guarding or rebound.      Hernia: No hernia is present.   Musculoskeletal:         General: Normal range of motion.   Skin:     General: Skin is warm and dry.   Neurological:      Mental Status: She is alert and oriented to person, place, and time.   Psychiatric:         Mood and Affect: Mood normal.         Behavior: Behavior normal.         Thought Content: Thought content normal.         Judgment: Judgment normal.            Assessment and Plan     Diagnoses and all orders for this " visit:    1. Primary hypertension (Primary)  -     amLODIPine (NORVASC) 5 MG tablet; Take 1 tablet by mouth Daily.  Dispense: 30 tablet; Refill: 0  -     Comprehensive Metabolic Panel    2. Hypothyroidism, unspecified type  -     TSH  -     T4, Free  -     T3, Free    3. Chronic diarrhea  -     Lipase    4. Vitamin D deficiency  -     Vitamin D,25-Hydroxy    5. Dyslipidemia  -     Lipid Panel    6. Class 3 severe obesity due to excess calories with serious comorbidity and body mass index (BMI) of 40.0 to 44.9 in adult    Patient has hypothyroidism.  Currently on levothyroxine 50 mcg daily.  She has been on medication for several years.  She does not take it on empty stomach.  Reviewed last TSH from lab work results on patient's phone and last TSH normal.  Will need to recheck TSH, free T4, free T3.  To rule out thyroid dysfunction as a cause or exacerbating factor of patient's chronic diarrhea.  Hypertension controlled today although per patient report uncontrolled at home.  Will stop hydralazine and start amlodipine 5 mg as this is a better medication for control.    Will check CMP  Will check lipase  Possible that chronic diarrhea is IBS, however given that this is interfering with patient's quality of life I think that colonoscopy with biopsies would be appropriate for further investigation. Referral to gastro for evaluation of chronic diarrhea.  Discussed dietary changes to help with HTN as well as diarrhea  Patient advised to use dicyclomine 4 times daily and Imodium for diarrhea.  Class 3 Severe Obesity (BMI >=40). Obesity-related health conditions include the following: hypertension and dyslipidemias. Obesity is newly identified. BMI is is above average; BMI management plan is completed. We discussed portion control and increasing exercise.         Discussion Summary:     Discussed plan of care in detail with patient today. Patient was encouraged to keep me informed of any acute changes, lack of improvement,  or any new concerning symptoms.  Patient is also aware of reasons to seek emergent care. Patient verbalized understanding and agrees with plan of care.    This visit was billed based on time.  I spent 60 minutes caring for Gloria D Shone on this date of service. This time includes time spent by me in the following activities:preparing for the visit, reviewing tests, obtaining and/or reviewing a separately obtained history, performing a medically appropriate examination and/or evaluation , counseling and educating the patient/family/caregiver, ordering medications, tests, or procedures, referring and communicating with other health care professionals , documenting information in the medical record, independently interpreting results and communicating that information with the patient/family/caregiver, and care coordination.    Follow Up  Return in about 1 month (around 9/20/2024) for Recheck HTN.    Please note that portions of this note may have been completed with a voice recognition program.     Mohsen Colunga MD, MPH  [facility]

## 2024-08-23 ENCOUNTER — PATIENT ROUNDING (BHMG ONLY) (OUTPATIENT)
Dept: FAMILY MEDICINE CLINIC | Facility: CLINIC | Age: 74
End: 2024-08-23
Payer: MEDICARE

## 2024-09-05 ENCOUNTER — TELEPHONE (OUTPATIENT)
Dept: FAMILY MEDICINE CLINIC | Facility: CLINIC | Age: 74
End: 2024-09-05
Payer: MEDICARE

## 2024-09-10 NOTE — TELEPHONE ENCOUNTER
Caller: Shone, Gloria D    Relationship: Self    Best call back number: [phone]    What is the best time to reach you: ANYTIME    Who are you requesting to speak with (clinical staff, provider,  specific staff member): PROVIDER OR CLINICAL STAFF    What was the call regarding: PATIENT IS CALLING FOR INFORMATION ON FLU AND RSV VACCINE. WHEN THEY WILL BE AVAILABLE AND IS SHE ABLE TO WALK IN    Is it okay if the provider responds through [employer]: NO    
Pt was very thankful for the call back. She has already received her vaccinations from another facility. She will marianela if she needs anything further.   
Yes

## 2024-09-19 ENCOUNTER — OFFICE VISIT (OUTPATIENT)
Dept: FAMILY MEDICINE CLINIC | Facility: CLINIC | Age: 74
End: 2024-09-19
Payer: MEDICARE

## 2024-09-19 VITALS
WEIGHT: 269 LBS | HEART RATE: 76 BPM | SYSTOLIC BLOOD PRESSURE: 122 MMHG | HEIGHT: 65 IN | RESPIRATION RATE: 16 BRPM | TEMPERATURE: 97.8 F | DIASTOLIC BLOOD PRESSURE: 82 MMHG | OXYGEN SATURATION: 96 % | BODY MASS INDEX: 44.82 KG/M2

## 2024-09-19 DIAGNOSIS — I10 PRIMARY HYPERTENSION: ICD-10-CM

## 2024-09-19 PROCEDURE — 1160F RVW MEDS BY RX/DR IN RCRD: CPT | Performed by: NURSE PRACTITIONER

## 2024-09-19 PROCEDURE — 3074F SYST BP LT 130 MM HG: CPT | Performed by: NURSE PRACTITIONER

## 2024-09-19 PROCEDURE — 3079F DIAST BP 80-89 MM HG: CPT | Performed by: NURSE PRACTITIONER

## 2024-09-19 PROCEDURE — 99213 OFFICE O/P EST LOW 20 MIN: CPT | Performed by: NURSE PRACTITIONER

## 2024-09-19 PROCEDURE — 1159F MED LIST DOCD IN RCRD: CPT | Performed by: NURSE PRACTITIONER

## 2024-09-19 RX ORDER — AMLODIPINE BESYLATE 5 MG/1
5 TABLET ORAL DAILY
Qty: 90 TABLET | Refills: 1 | Status: SHIPPED | OUTPATIENT
Start: 2024-09-19

## 2024-09-23 ENCOUNTER — OFFICE VISIT (OUTPATIENT)
Dept: FAMILY MEDICINE CLINIC | Facility: CLINIC | Age: 74
End: 2024-09-23
Payer: MEDICARE

## 2024-09-23 VITALS
RESPIRATION RATE: 20 BRPM | SYSTOLIC BLOOD PRESSURE: 118 MMHG | OXYGEN SATURATION: 95 % | DIASTOLIC BLOOD PRESSURE: 80 MMHG | TEMPERATURE: 98 F | BODY MASS INDEX: 45.35 KG/M2 | HEIGHT: 65 IN | WEIGHT: 272.2 LBS | HEART RATE: 74 BPM

## 2024-09-23 DIAGNOSIS — I10 PRIMARY HYPERTENSION: ICD-10-CM

## 2024-09-23 DIAGNOSIS — E03.9 ACQUIRED HYPOTHYROIDISM: ICD-10-CM

## 2024-09-23 DIAGNOSIS — E55.9 VITAMIN D DEFICIENCY: ICD-10-CM

## 2024-09-23 DIAGNOSIS — E66.01 CLASS 3 SEVERE OBESITY DUE TO EXCESS CALORIES WITH SERIOUS COMORBIDITY AND BODY MASS INDEX (BMI) OF 40.0 TO 44.9 IN ADULT: ICD-10-CM

## 2024-09-23 DIAGNOSIS — E78.5 DYSLIPIDEMIA: ICD-10-CM

## 2024-09-23 DIAGNOSIS — K52.9 CHRONIC DIARRHEA: Primary | ICD-10-CM

## 2024-09-23 PROCEDURE — 1160F RVW MEDS BY RX/DR IN RCRD: CPT | Performed by: FAMILY MEDICINE

## 2024-09-23 PROCEDURE — 1159F MED LIST DOCD IN RCRD: CPT | Performed by: FAMILY MEDICINE

## 2024-09-23 PROCEDURE — 3074F SYST BP LT 130 MM HG: CPT | Performed by: FAMILY MEDICINE

## 2024-09-23 PROCEDURE — 3079F DIAST BP 80-89 MM HG: CPT | Performed by: FAMILY MEDICINE

## 2024-09-23 PROCEDURE — 99215 OFFICE O/P EST HI 40 MIN: CPT | Performed by: FAMILY MEDICINE

## 2024-09-23 PROCEDURE — G2211 COMPLEX E/M VISIT ADD ON: HCPCS | Performed by: FAMILY MEDICINE

## 2024-09-23 RX ORDER — LOPERAMIDE HCL 2 MG
2 CAPSULE ORAL 4 TIMES DAILY PRN
Qty: 120 CAPSULE | Refills: 0 | Status: SHIPPED | OUTPATIENT
Start: 2024-09-23

## 2024-09-24 LAB
25(OH)D3+25(OH)D2 SERPL-MCNC: 35 NG/ML (ref 30–100)
ALBUMIN SERPL-MCNC: 3.9 G/DL (ref 3.8–4.8)
ALP SERPL-CCNC: 122 IU/L (ref 44–121)
ALT SERPL-CCNC: 27 IU/L (ref 0–32)
AST SERPL-CCNC: 27 IU/L (ref 0–40)
BILIRUB SERPL-MCNC: 0.5 MG/DL (ref 0–1.2)
BUN SERPL-MCNC: 13 MG/DL (ref 8–27)
BUN/CREAT SERPL: 19 (ref 12–28)
CALCIUM SERPL-MCNC: 9.3 MG/DL (ref 8.7–10.3)
CHLORIDE SERPL-SCNC: 107 MMOL/L (ref 96–106)
CHOLEST SERPL-MCNC: 145 MG/DL (ref 100–199)
CO2 SERPL-SCNC: 22 MMOL/L (ref 20–29)
CREAT SERPL-MCNC: 0.7 MG/DL (ref 0.57–1)
EGFRCR SERPLBLD CKD-EPI 2021: 90 ML/MIN/1.73
GLOBULIN SER CALC-MCNC: 3.1 G/DL (ref 1.5–4.5)
GLUCOSE SERPL-MCNC: 101 MG/DL (ref 70–99)
HDLC SERPL-MCNC: 35 MG/DL
LDLC SERPL CALC-MCNC: 62 MG/DL (ref 0–99)
LIPASE SERPL-CCNC: 20 U/L (ref 14–85)
POTASSIUM SERPL-SCNC: 3.9 MMOL/L (ref 3.5–5.2)
PROT SERPL-MCNC: 7 G/DL (ref 6–8.5)
SODIUM SERPL-SCNC: 144 MMOL/L (ref 134–144)
T3FREE SERPL-MCNC: 2.4 PG/ML (ref 2–4.4)
T4 FREE SERPL-MCNC: 1.15 NG/DL (ref 0.82–1.77)
TRIGL SERPL-MCNC: 303 MG/DL (ref 0–149)
TSH SERPL DL<=0.005 MIU/L-ACNC: 1.02 UIU/ML (ref 0.45–4.5)
VLDLC SERPL CALC-MCNC: 48 MG/DL (ref 5–40)

## 2024-10-03 ENCOUNTER — TELEPHONE (OUTPATIENT)
Dept: FAMILY MEDICINE CLINIC | Facility: CLINIC | Age: 74
End: 2024-10-03
Payer: MEDICARE

## 2024-10-03 ENCOUNTER — DOCUMENTATION (OUTPATIENT)
Dept: FAMILY MEDICINE CLINIC | Facility: CLINIC | Age: 74
End: 2024-10-03
Payer: MEDICARE

## 2024-10-03 DIAGNOSIS — R74.8 ELEVATED ALKALINE PHOSPHATASE LEVEL: Primary | ICD-10-CM

## 2024-10-03 DIAGNOSIS — R73.9 HYPERGLYCEMIA: ICD-10-CM

## 2024-10-03 DIAGNOSIS — E78.5 DYSLIPIDEMIA: Primary | ICD-10-CM

## 2024-10-03 DIAGNOSIS — I10 PRIMARY HYPERTENSION: ICD-10-CM

## 2024-10-03 RX ORDER — LEVOTHYROXINE SODIUM 50 UG/1
50 TABLET ORAL
Qty: 90 TABLET | Refills: 3 | Status: SHIPPED | OUTPATIENT
Start: 2024-10-03

## 2024-10-03 RX ORDER — LOSARTAN POTASSIUM 100 MG/1
100 TABLET ORAL DAILY
Qty: 90 TABLET | Refills: 3 | Status: SHIPPED | OUTPATIENT
Start: 2024-10-03

## 2024-10-03 NOTE — TELEPHONE ENCOUNTER
HUB TO READ: Called pt to inform her the additional lab order is in and she needs to be fasting for at least 12 hours before getting lab drawn. No answer lvm to call office back if she had any questions.

## 2024-10-03 NOTE — TELEPHONE ENCOUNTER
Rx Refill Note  Requested Prescriptions     Pending Prescriptions Disp Refills    levothyroxine (SYNTHROID, LEVOTHROID) 50 MCG tablet       Sig: Take 1 tablet by mouth Daily.    losartan (COZAAR) 100 MG tablet       Sig: Take 1 tablet by mouth Daily.      Last office visit with prescribing clinician: 9/23/2024   Last telemedicine visit with prescribing clinician: Visit date not found   Next office visit with prescribing clinician: 10/3/2024                         Would you like a call back once the refill request has been completed: [] Yes [] No    If the office needs to give you a call back, can they leave a voicemail: [] Yes [] No    Maryann Mendoza MA  10/03/24, 14:23 EDT     [FreeTextEntry1] : Discussed with the patient health care maintenance.  \par Discussed age and condition appropriate vaccinations.  \par Discussed age appropriate cancer screening testing as indicated including colon cancer screening/colonoscopy, cervical cancer screening/PAP testing, breast cancer screening/mammogram and skin cancer screening.  \par Discussed protection from the sun.  \par Discussed healthy diet, exercise and weight control for health.\par Discussed healthy habits including abstaining from smoking and drugs and abstention/moderation with alcohol.\par Discussed routine regular ophthalmology and dental follow up.\par Routine labs discussed with the patient and sent.

## 2024-10-03 NOTE — TELEPHONE ENCOUNTER
Pt presented to the office to have questions answered, update medication and possibly have blood work done. She did not have orders for any blood work to be done today but Dr. Colunga has ordered blood work to be done before the follow up in 3-6 months.

## 2024-10-03 NOTE — TELEPHONE ENCOUNTER
Patient needs refills on all prescriptions from previous provider. She is going to stop by as she didn't have time to go over them at the time of the call. I asked her to come after 1pm as lunch time is a challenge. I told her she would need to speak with Dr. Colunga's MA.

## 2024-10-29 ENCOUNTER — TELEPHONE (OUTPATIENT)
Dept: FAMILY MEDICINE CLINIC | Facility: CLINIC | Age: 74
End: 2024-10-29
Payer: MEDICARE

## 2024-10-29 NOTE — TELEPHONE ENCOUNTER
PT ON THE PHONE ASKING IF J[employer] PAPERWORK SHE DROPPED OFF YESTERDAY IS COMPLETED? PT STATED IT IS PAST DUE AND IS WORRIED.

## 2024-11-05 ENCOUNTER — OFFICE VISIT (OUTPATIENT)
Dept: FAMILY MEDICINE CLINIC | Facility: CLINIC | Age: 74
End: 2024-11-05
Payer: MEDICARE

## 2024-11-05 VITALS
DIASTOLIC BLOOD PRESSURE: 66 MMHG | HEART RATE: 86 BPM | BODY MASS INDEX: 44.35 KG/M2 | SYSTOLIC BLOOD PRESSURE: 124 MMHG | OXYGEN SATURATION: 96 % | HEIGHT: 65 IN | WEIGHT: 266.2 LBS | RESPIRATION RATE: 18 BRPM

## 2024-11-05 DIAGNOSIS — S91.109A OPEN WOUND OF TOE, INITIAL ENCOUNTER: ICD-10-CM

## 2024-11-05 DIAGNOSIS — L60.0 INGROWN TOENAIL OF RIGHT FOOT: ICD-10-CM

## 2024-11-05 DIAGNOSIS — I87.2 VENOUS INSUFFICIENCY OF LOWER EXTREMITY: ICD-10-CM

## 2024-11-05 DIAGNOSIS — L03.031 CELLULITIS OF GREAT TOE OF RIGHT FOOT: Primary | ICD-10-CM

## 2024-11-05 PROCEDURE — 3078F DIAST BP <80 MM HG: CPT | Performed by: STUDENT IN AN ORGANIZED HEALTH CARE EDUCATION/TRAINING PROGRAM

## 2024-11-05 PROCEDURE — 3074F SYST BP LT 130 MM HG: CPT | Performed by: STUDENT IN AN ORGANIZED HEALTH CARE EDUCATION/TRAINING PROGRAM

## 2024-11-05 PROCEDURE — 99213 OFFICE O/P EST LOW 20 MIN: CPT | Performed by: STUDENT IN AN ORGANIZED HEALTH CARE EDUCATION/TRAINING PROGRAM

## 2024-11-05 RX ORDER — CEPHALEXIN 500 MG/1
500 CAPSULE ORAL 3 TIMES DAILY
COMMUNITY
Start: 2024-10-30

## 2024-11-05 NOTE — PROGRESS NOTES
Same Day Office Visit      Date: 2024   Patient Name: Gloria D Shone  : 1950   MRN: [id number]     Chief Complaint:    Chief Complaint   Patient presents with   • Toe Pain     Bilateral foot pain, Left foot- 4th toe- infected, Right foot- 1st toe- infected       History of Present Illness: Gloria D Shone is a 74 y.o. female who is here today for evaluation/management of chronic problems related to her feet including toenail trimming.  Patient reports that it is difficult to take care of her feet in terms of bending down to reach them.  Patient appears to have infected toenails previously per history. Patient reports she does see wound care weekly in [place], [place] with a Dr. Sandoval related to chronic wounds of lower extremities with emphasis on right lower extremity.  Patient reports she is currently on a 10-day course of antibiotics for her right great toe with Keflex.  Patient reports right great toe was swollen when she saw wound care physician last week. She states physician reported some red streaking past right great toe onto foot.  Streaking up past the right toe reportedly has improved.  Patient reports she typically has no feeling in her lower feet. Patient denies history of diabetes. Patient reports having seen podiatry previously though has not in some time and is unsure physician or practice name.      Subjective     I have reviewed the patients family history, social history, past medical history, past surgical history and have updated it as appropriate.     Medications:     Current Outpatient Medications:   •  acyclovir (ZOVIRAX) 200 MG capsule, Take 1 capsule by mouth 2 (Two) Times a Day., Disp: , Rfl:   •  amLODIPine (NORVASC) 5 MG tablet, Take 1 tablet by mouth Daily., Disp: 90 tablet, Rfl: 1  •  CALCIUM PO, Take  by mouth., Disp: , Rfl:   •  cephalexin (KEFLEX) 500 MG capsule, Take 1 capsule by mouth 3 (Three) Times a Day., Disp: , Rfl:   •  Coenzyme Q10 (COQ-10 PO), Take  by mouth.,  Disp: , Rfl:   •  dicyclomine (BENTYL) 20 MG tablet, Take 1 tablet by mouth Every 6 (Six) Hours., Disp: , Rfl:   •  hydrALAZINE (APRESOLINE) 10 MG tablet, Take 1 tablet by mouth 2 (Two) Times a Day., Disp: , Rfl:   •  levothyroxine (SYNTHROID, LEVOTHROID) 50 MCG tablet, Take 1 tablet by mouth Every Morning. On an empty stomach, Disp: 90 tablet, Rfl: 3  •  loperamide (IMODIUM) 2 MG capsule, Take 1 capsule by mouth 4 (Four) Times a Day As Needed for Diarrhea., Disp: 120 capsule, Rfl: 0  •  losartan (COZAAR) 100 MG tablet, Take 1 tablet by mouth Daily., Disp: 90 tablet, Rfl: 3  •  MAGNESIUM ASPARTATE PO, Take  by mouth., Disp: , Rfl:   •  Multiple Vitamin (multivitamin) capsule, Take 1 capsule by mouth Daily., Disp: , Rfl:   •  Multiple Vitamins-Minerals (b complex-C-E-zinc) tablet, Take 1 tablet by mouth Daily., Disp: , Rfl:   •  mupirocin (BACTROBAN) 2 % ointment, Apply 1 Application topically to the appropriate area as directed., Disp: , Rfl:   •  NON FORMULARY, by Other route. Pt states she is taking D-LIMONENE FLAVOR, Disp: , Rfl:   •  Vitamin E 45 MG (100 UNIT) capsule, Take 1 capsule by mouth Daily., Disp: , Rfl:     Allergies:   Allergies   Allergen Reactions   • Chlorpheniramine Hives and Other (See Comments)   • Sulfa Antibiotics Nausea Only     Other reaction(s): Nausea and/or Vomiting   vomiting   reviewed in [facility]   GI UPSET   • Sulfate Nausea Only     Other reaction(s): Nausea and/or Vomiting   vomiting   reviewed in [facility]   Other reaction(s): Nausea and/or Vomiting   vomiting   reviewed in [facility]   GI UPSET    GI UPSET    Other reaction(s): Nausea and/or Vomiting   vomiting   reviewed in [facility]   GI UPSET     Other reaction(s): Nausea and/or Vomiting   vomiting   reviewed in [facility]   GI UPSET   • Hydrocodone Cough   • Morphine Nausea And Vomiting and Other (See Comments)     [HC:2003-03-27]   Other reaction(s): Nausea and/or Vomiting   [HC:2003-03-27]    Other reaction(s): Nausea and/or Vomiting   [HC:2003-03-27]  "      Objective     Physical Exam:     Vital Signs:   Vitals:    11/05/24 1446   BP: 124/66   Pulse: 86   Resp: 18   SpO2: 96%   Weight: 121 kg (266 lb 3.2 oz)   Height: 165.1 cm (65\")     Body mass index is 44.3 kg/m².        Physical Exam     General:  Nontoxic appearing female in no acute distress. Alert and oriented. Vitals reviewed and are within normal limits.  Head/ENT: Atraumatic.   Neck: Anatomy appears symmetrical.   Cardiac: Patient appears well perfused with regular rate.  Pulmonary: No signs of respiratory distress.  Extremities: Right leg is wrapped/dressed. Erythematous and somewhat swollen right great toe.  There is small amount of bleeding around the nail as well as granulation tissue. Nail appears to be ingrown. Erythema does not appear to extend past the right great toe onto the foot.  Physical assessment of left foot shows a chronic appearing ulcer on the top of left second toe. This does not appear infected. Feet examination shows decreased sensation bilaterally to monofilament exam.   Integumentary/Skin: See extremities.  Neurological: Normal speech. Decreased sensation in feet bilaterally - see extremities.  Behavioral/Psych: Patient behavior/demeanor appears consistent with reported age. Patient is pleasant with normal affect today.      Procedures    Assessment / Plan      1. Cellulitis of great toe of right foot  Patient presents in office today for evaluation/management of feet including recurrent toenail infections and need of regular toenail trimming.  Patient appears to have chronic venous insufficiency.  Patient regularly sees wound care in [facility] every week for lower extremity wounds including chronic cutaneous venous stasis ulcer of right leg. Patient from history appears to have been seen by podiatry previously though appears to be not currently following with them.  Patient reports that she was told over the phone by a podiatry office she contacted that her family " physician would be able to manage her feet.  Physical assessment of patient in office today shows erythematous and somewhat swollen right great toe.  There is small amount of bleeding around the nail as well as granulation tissue. Nail appears to be ingrown. Erythema does not appear to extend past the right great toe onto the foot.  Physical assessment of left foot shows a chronic appearing ulcer on the top of left second toe. This does not appear infected.  Patient is currently on day 6 of a 10-day course of Keflex from her wound care physician.  Based on history toe appears somewhat improved given patient reported wound care physician discussing streaking which is not present on exam today.  Patient is hemodynamically stable in office today.    Patient does have cellulitis involving right great toe.  To what extent this has improved on the Keflex is unknown.  I do discuss with patient that she may need extended course of Keflex or alternate antibiotic, given she is on day 6 of treatment with continuing involvement of right toe, if significant improvement is not seen within the next day or so.  Patient is seeing wound care tomorrow who is managing Keflex. I suspect patient will need alternate antibiotic. Patient does wish to discuss with wound care provider.  I do discuss with patient return precautions that would necessitate her being seen in the urgent/emergent setting including development of fever, worsening erythema/swelling of right great toe, developing redness/swelling of her right foot.  Patient voices understanding of this. I do discuss in regards to toenail trimming that toenails will not be trimmed while acutely infected. Adding to this I do discuss with patient that given the complexity of her lower extremity venous insufficiency, that is likely contributing to difficulty healing and recurrent toenail infections, that evaluation/management by podiatry of her feet would be beneficial.  Patient has  previously seen podiatry though patient does not recall provider organization or physician name.  Will provide new podiatry referral today to [facility] in [place]. Lastly, patient does report seeing nail salon for feet. I have counseled patient that instruments used in nail salons/pedicures can contribute to toenail/foot infections given many of the instruments used in pedicures are not properly sanitized.    2. Ingrown toenail of right foot  See cellulitis of right great toe.  - Ambulatory Referral to Podiatry    3. Open wound of toe, initial encounter  See cellulitis of right great toe.  - Ambulatory Referral to Podiatry    4. Venous insufficiency of lower extremity  See cellulitis of right great toe.  - Ambulatory Referral to Podiatry       Follow Up:   No follow-ups on file.      MD WAYLON Mendoza PC [facility]  [address] [place] [address] [address] [place] [address]  Fax [phone]  Phone [phone]

## 2024-12-09 ENCOUNTER — APPOINTMENT (OUTPATIENT)
Dept: GENERAL RADIOLOGY | Facility: HOSPITAL | Age: 74
End: 2024-12-09
Payer: MEDICARE

## 2024-12-09 ENCOUNTER — HOSPITAL ENCOUNTER (INPATIENT)
Facility: HOSPITAL | Age: 74
LOS: 2 days | Discharge: HOME | End: 2024-12-11
Attending: STUDENT IN AN ORGANIZED HEALTH CARE EDUCATION/TRAINING PROGRAM | Admitting: INTERNAL MEDICINE
Payer: MEDICARE

## 2024-12-09 ENCOUNTER — APPOINTMENT (OUTPATIENT)
Dept: MRI IMAGING | Facility: HOSPITAL | Age: 74
End: 2024-12-09
Payer: MEDICARE

## 2024-12-09 DIAGNOSIS — M86.172 OTHER ACUTE OSTEOMYELITIS OF LEFT FOOT: ICD-10-CM

## 2024-12-09 DIAGNOSIS — M86.9 OSTEOMYELITIS OF LEFT FOOT, UNSPECIFIED TYPE: Primary | ICD-10-CM

## 2024-12-09 LAB
ALBUMIN SERPL-MCNC: 3.5 G/DL (ref 3.5–5.2)
ALBUMIN/GLOB SERPL: 0.9 G/DL
ALP SERPL-CCNC: 111 U/L (ref 39–117)
ALT SERPL W P-5'-P-CCNC: 17 U/L (ref 1–33)
ANION GAP SERPL CALCULATED.3IONS-SCNC: 14.2 MMOL/L (ref 5–15)
AST SERPL-CCNC: 24 U/L (ref 1–32)
BASOPHILS # BLD AUTO: 0.05 10*3/MM3 (ref 0–0.2)
BASOPHILS NFR BLD AUTO: 0.5 % (ref 0–1.5)
BILIRUB SERPL-MCNC: 1.2 MG/DL (ref 0–1.2)
BUN SERPL-MCNC: 12 MG/DL (ref 8–23)
BUN/CREAT SERPL: 17.4 (ref 7–25)
CALCIUM SPEC-SCNC: 9 MG/DL (ref 8.6–10.5)
CHLORIDE SERPL-SCNC: 103 MMOL/L (ref 98–107)
CO2 SERPL-SCNC: 20.8 MMOL/L (ref 22–29)
CREAT SERPL-MCNC: 0.69 MG/DL (ref 0.57–1)
CRP SERPL-MCNC: 6.58 MG/DL (ref 0–0.5)
D-LACTATE SERPL-SCNC: 1.2 MMOL/L (ref 0.5–2)
DEPRECATED RDW RBC AUTO: 43.2 FL (ref 37–54)
EGFRCR SERPLBLD CKD-EPI 2021: 90.1 ML/MIN/1.73
EOSINOPHIL # BLD AUTO: 0.13 10*3/MM3 (ref 0–0.4)
EOSINOPHIL NFR BLD AUTO: 1.3 % (ref 0.3–6.2)
ERYTHROCYTE [DISTWIDTH] IN BLOOD BY AUTOMATED COUNT: 14.8 % (ref 12.3–15.4)
GLOBULIN UR ELPH-MCNC: 4.1 GM/DL
GLUCOSE SERPL-MCNC: 134 MG/DL (ref 65–99)
HBA1C MFR BLD: 6 % (ref 4.8–5.6)
HCT VFR BLD AUTO: 42.2 % (ref 34–46.6)
HGB BLD-MCNC: 14.3 G/DL (ref 12–15.9)
IMM GRANULOCYTES # BLD AUTO: 0.02 10*3/MM3 (ref 0–0.05)
IMM GRANULOCYTES NFR BLD AUTO: 0.2 % (ref 0–0.5)
LYMPHOCYTES # BLD AUTO: 1.68 10*3/MM3 (ref 0.7–3.1)
LYMPHOCYTES NFR BLD AUTO: 16.5 % (ref 19.6–45.3)
MCH RBC QN AUTO: 27.4 PG (ref 26.6–33)
MCHC RBC AUTO-ENTMCNC: 33.9 G/DL (ref 31.5–35.7)
MCV RBC AUTO: 80.8 FL (ref 79–97)
MONOCYTES # BLD AUTO: 0.78 10*3/MM3 (ref 0.1–0.9)
MONOCYTES NFR BLD AUTO: 7.7 % (ref 5–12)
NEUTROPHILS NFR BLD AUTO: 7.51 10*3/MM3 (ref 1.7–7)
NEUTROPHILS NFR BLD AUTO: 73.8 % (ref 42.7–76)
NRBC BLD AUTO-RTO: 0 /100 WBC (ref 0–0.2)
PLATELET # BLD AUTO: 321 10*3/MM3 (ref 140–450)
PMV BLD AUTO: 9 FL (ref 6–12)
POTASSIUM SERPL-SCNC: 4.2 MMOL/L (ref 3.5–5.2)
PROT SERPL-MCNC: 7.6 G/DL (ref 6–8.5)
RBC # BLD AUTO: 5.22 10*6/MM3 (ref 3.77–5.28)
SODIUM SERPL-SCNC: 138 MMOL/L (ref 136–145)
WBC NRBC COR # BLD AUTO: 10.17 10*3/MM3 (ref 3.4–10.8)

## 2024-12-09 PROCEDURE — 83605 ASSAY OF LACTIC ACID: CPT | Performed by: NURSE PRACTITIONER

## 2024-12-09 PROCEDURE — 87070 CULTURE OTHR SPECIMN AEROBIC: CPT | Performed by: NURSE PRACTITIONER

## 2024-12-09 PROCEDURE — 87040 BLOOD CULTURE FOR BACTERIA: CPT | Performed by: NURSE PRACTITIONER

## 2024-12-09 PROCEDURE — 73718 MRI LOWER EXTREMITY W/O DYE: CPT

## 2024-12-09 PROCEDURE — 99222 1ST HOSP IP/OBS MODERATE 55: CPT | Performed by: NURSE PRACTITIONER

## 2024-12-09 PROCEDURE — 25010000002 VANCOMYCIN 5 G RECONSTITUTED SOLUTION: Performed by: NURSE PRACTITIONER

## 2024-12-09 PROCEDURE — 25010000002 CEFEPIME PER 500 MG: Performed by: NURSE PRACTITIONER

## 2024-12-09 PROCEDURE — 87186 SC STD MICRODIL/AGAR DIL: CPT | Performed by: NURSE PRACTITIONER

## 2024-12-09 PROCEDURE — 25010000002 DIPHENHYDRAMINE PER 50 MG: Performed by: FAMILY MEDICINE

## 2024-12-09 PROCEDURE — 87147 CULTURE TYPE IMMUNOLOGIC: CPT | Performed by: NURSE PRACTITIONER

## 2024-12-09 PROCEDURE — 80053 COMPREHEN METABOLIC PANEL: CPT | Performed by: NURSE PRACTITIONER

## 2024-12-09 PROCEDURE — 87641 MR-STAPH DNA AMP PROBE: CPT | Performed by: NURSE PRACTITIONER

## 2024-12-09 PROCEDURE — 87205 SMEAR GRAM STAIN: CPT | Performed by: NURSE PRACTITIONER

## 2024-12-09 PROCEDURE — 73630 X-RAY EXAM OF FOOT: CPT

## 2024-12-09 PROCEDURE — 99285 EMERGENCY DEPT VISIT HI MDM: CPT | Performed by: STUDENT IN AN ORGANIZED HEALTH CARE EDUCATION/TRAINING PROGRAM

## 2024-12-09 PROCEDURE — 87181 SC STD AGAR DILUTION PER AGT: CPT | Performed by: NURSE PRACTITIONER

## 2024-12-09 PROCEDURE — 36415 COLL VENOUS BLD VENIPUNCTURE: CPT

## 2024-12-09 PROCEDURE — 87077 CULTURE AEROBIC IDENTIFY: CPT | Performed by: NURSE PRACTITIONER

## 2024-12-09 PROCEDURE — 25810000003 SODIUM CHLORIDE 0.9 % SOLUTION: Performed by: NURSE PRACTITIONER

## 2024-12-09 PROCEDURE — 86140 C-REACTIVE PROTEIN: CPT | Performed by: NURSE PRACTITIONER

## 2024-12-09 PROCEDURE — 85025 COMPLETE CBC W/AUTO DIFF WBC: CPT | Performed by: NURSE PRACTITIONER

## 2024-12-09 PROCEDURE — 83036 HEMOGLOBIN GLYCOSYLATED A1C: CPT | Performed by: NURSE PRACTITIONER

## 2024-12-09 RX ORDER — DOXYCYCLINE 100 MG/1
100 CAPSULE ORAL EVERY 12 HOURS SCHEDULED
Status: DISCONTINUED | OUTPATIENT
Start: 2024-12-09 | End: 2024-12-11

## 2024-12-09 RX ORDER — ACETAMINOPHEN 325 MG/1
650 TABLET ORAL EVERY 4 HOURS PRN
Status: DISCONTINUED | OUTPATIENT
Start: 2024-12-09 | End: 2024-12-11 | Stop reason: HOSPADM

## 2024-12-09 RX ORDER — ACETAMINOPHEN 160 MG/5ML
650 SOLUTION ORAL EVERY 4 HOURS PRN
Status: DISCONTINUED | OUTPATIENT
Start: 2024-12-09 | End: 2024-12-11 | Stop reason: HOSPADM

## 2024-12-09 RX ORDER — HYDRALAZINE HYDROCHLORIDE 10 MG/1
10 TABLET, FILM COATED ORAL 2 TIMES DAILY
Status: DISCONTINUED | OUTPATIENT
Start: 2024-12-09 | End: 2024-12-11 | Stop reason: HOSPADM

## 2024-12-09 RX ORDER — POLYETHYLENE GLYCOL 3350 17 G/17G
17 POWDER, FOR SOLUTION ORAL DAILY PRN
Status: DISCONTINUED | OUTPATIENT
Start: 2024-12-09 | End: 2024-12-11 | Stop reason: HOSPADM

## 2024-12-09 RX ORDER — ACETAMINOPHEN 650 MG/1
650 SUPPOSITORY RECTAL EVERY 4 HOURS PRN
Status: DISCONTINUED | OUTPATIENT
Start: 2024-12-09 | End: 2024-12-11 | Stop reason: HOSPADM

## 2024-12-09 RX ORDER — DIPHENHYDRAMINE HYDROCHLORIDE 50 MG/ML
50 INJECTION INTRAMUSCULAR; INTRAVENOUS ONCE
Status: COMPLETED | OUTPATIENT
Start: 2024-12-09 | End: 2024-12-09

## 2024-12-09 RX ORDER — LEVOTHYROXINE SODIUM 50 UG/1
50 TABLET ORAL
Status: DISCONTINUED | OUTPATIENT
Start: 2024-12-10 | End: 2024-12-11 | Stop reason: HOSPADM

## 2024-12-09 RX ORDER — BISACODYL 10 MG
10 SUPPOSITORY, RECTAL RECTAL DAILY PRN
Status: DISCONTINUED | OUTPATIENT
Start: 2024-12-09 | End: 2024-12-11 | Stop reason: HOSPADM

## 2024-12-09 RX ORDER — ONDANSETRON 2 MG/ML
4 INJECTION INTRAMUSCULAR; INTRAVENOUS EVERY 6 HOURS PRN
Status: DISCONTINUED | OUTPATIENT
Start: 2024-12-09 | End: 2024-12-11 | Stop reason: HOSPADM

## 2024-12-09 RX ORDER — SODIUM CHLORIDE 9 MG/ML
40 INJECTION, SOLUTION INTRAVENOUS AS NEEDED
Status: DISCONTINUED | OUTPATIENT
Start: 2024-12-09 | End: 2024-12-11 | Stop reason: HOSPADM

## 2024-12-09 RX ORDER — BISACODYL 5 MG/1
5 TABLET, DELAYED RELEASE ORAL DAILY PRN
Status: DISCONTINUED | OUTPATIENT
Start: 2024-12-09 | End: 2024-12-11 | Stop reason: HOSPADM

## 2024-12-09 RX ORDER — SODIUM CHLORIDE 0.9 % (FLUSH) 0.9 %
10 SYRINGE (ML) INJECTION EVERY 12 HOURS SCHEDULED
Status: DISCONTINUED | OUTPATIENT
Start: 2024-12-09 | End: 2024-12-11 | Stop reason: HOSPADM

## 2024-12-09 RX ORDER — VANCOMYCIN 2 GRAM/500 ML IN 0.9 % SODIUM CHLORIDE INTRAVENOUS
2000 EVERY 24 HOURS
Status: DISCONTINUED | OUTPATIENT
Start: 2024-12-10 | End: 2024-12-10

## 2024-12-09 RX ORDER — LOSARTAN POTASSIUM 50 MG/1
100 TABLET ORAL DAILY
Status: DISCONTINUED | OUTPATIENT
Start: 2024-12-09 | End: 2024-12-11 | Stop reason: HOSPADM

## 2024-12-09 RX ORDER — AMLODIPINE BESYLATE 5 MG/1
5 TABLET ORAL DAILY
Status: DISCONTINUED | OUTPATIENT
Start: 2024-12-09 | End: 2024-12-11 | Stop reason: HOSPADM

## 2024-12-09 RX ORDER — AMOXICILLIN 250 MG
2 CAPSULE ORAL 2 TIMES DAILY PRN
Status: DISCONTINUED | OUTPATIENT
Start: 2024-12-09 | End: 2024-12-11 | Stop reason: HOSPADM

## 2024-12-09 RX ORDER — SODIUM CHLORIDE 0.9 % (FLUSH) 0.9 %
10 SYRINGE (ML) INJECTION AS NEEDED
Status: DISCONTINUED | OUTPATIENT
Start: 2024-12-09 | End: 2024-12-11 | Stop reason: HOSPADM

## 2024-12-09 RX ADMIN — VANCOMYCIN HYDROCHLORIDE 2500 MG: 5 INJECTION, POWDER, LYOPHILIZED, FOR SOLUTION INTRAVENOUS at 18:16

## 2024-12-09 RX ADMIN — DIPHENHYDRAMINE HYDROCHLORIDE 50 MG: 50 INJECTION, SOLUTION INTRAMUSCULAR; INTRAVENOUS at 21:04

## 2024-12-09 RX ADMIN — Medication 10 ML: at 21:05

## 2024-12-09 RX ADMIN — DOXYCYCLINE 100 MG: 100 CAPSULE ORAL at 21:04

## 2024-12-09 RX ADMIN — CEFEPIME 2000 MG: 2 INJECTION, POWDER, FOR SOLUTION INTRAVENOUS at 17:09

## 2024-12-09 RX ADMIN — MELATONIN TAB 5 MG 5 MG: 5 TAB at 21:05

## 2024-12-09 NOTE — ED PROVIDER NOTES
Subjective:  History of Present Illness:    Patient is a 74-year-old female without contributing health history.  Presents to the ER today with wounds to left second and third toe.  Patient reports that these wounds have been present for approximately 2 months.  Reports that wounds were caused by her rubbing her feet on her shoe.  Reports that over the last 24 hours started noticing green discharge from third toe.  Patient reports that she has seen podiatrist Dr. Gomes.  Wound culture was completed however results are not back yet.  She denies fever.  Denies OTC medication home remedy.  Denies alleviating or exacerbating factors.    Nurses Notes reviewed and agree, including vitals, allergies, social history and prior medical history.     REVIEW OF SYSTEMS: All systems reviewed and not pertinent unless noted.  Review of Systems   Skin:  Positive for wound.   All other systems reviewed and are negative.      Past Medical History:   Diagnosis Date    Breast cancer     Hypertension     Hypothyroidism     Sleep apnea        Allergies:    Chlorpheniramine, Sulfa antibiotics, Sulfate, Hydrocodone, and Morphine      Past Surgical History:   Procedure Laterality Date    BREAST LUMPECTOMY      CERVICAL FUSION      CHOLECYSTECTOMY      FINGER TENDON REPAIR      HYSTERECTOMY      REPLACEMENT TOTAL KNEE BILATERAL      TOE TENDON REPAIR      TONSILLECTOMY      TOTAL HIP ARTHROPLASTY           Social History     Socioeconomic History    Marital status:    Tobacco Use    Smoking status: Never     Passive exposure: Never    Smokeless tobacco: Never   Vaping Use    Vaping status: Never Used   Substance and Sexual Activity    Alcohol use: Not Currently     Comment: social    Drug use: Never    Sexual activity: Defer         Family History   Problem Relation Age of Onset    Kidney failure Mother     Heart disease Father     Heart attack Father     Heart attack Paternal Uncle     Heart attack Maternal Grandfather     Heart attack  "Paternal Grandfather        Objective  Physical Exam:  /98   Pulse 92   Temp 97.2 °F (36.2 °C) (Oral)   Resp 20   Ht 166.4 cm (65.5\")   Wt 119 kg (263 lb)   SpO2 98%   BMI 43.10 kg/m²      Physical Exam  Vitals and nursing note reviewed.   Constitutional:       Appearance: Normal appearance. She is normal weight.   HENT:      Head: Normocephalic and atraumatic.      Nose: Nose normal.      Mouth/Throat:      Mouth: Mucous membranes are moist.      Pharynx: Oropharynx is clear.   Eyes:      Extraocular Movements: Extraocular movements intact.      Conjunctiva/sclera: Conjunctivae normal.      Pupils: Pupils are equal, round, and reactive to light.   Cardiovascular:      Rate and Rhythm: Normal rate and regular rhythm.      Pulses: Normal pulses.      Heart sounds: Normal heart sounds.   Pulmonary:      Effort: Pulmonary effort is normal.      Breath sounds: Normal breath sounds.   Abdominal:      General: Abdomen is flat. Bowel sounds are normal.      Palpations: Abdomen is soft.   Musculoskeletal:         General: Normal range of motion.      Cervical back: Normal range of motion and neck supple.   Skin:     General: Skin is warm.      Capillary Refill: Capillary refill takes less than 2 seconds.      Findings: Lesion present.   Neurological:      General: No focal deficit present.      Mental Status: She is alert and oriented to person, place, and time. Mental status is at baseline.   Psychiatric:         Mood and Affect: Mood normal.         Behavior: Behavior normal.         Thought Content: Thought content normal.         Judgment: Judgment normal.         Procedures    ED Course:         Lab Results (last 24 hours)       Procedure Component Value Units Date/Time    Comprehensive Metabolic Panel [[id number]]  (Abnormal) Collected: 12/09/24 1147    Specimen: Blood Updated: 12/09/24 1218     Glucose 134 mg/dL      BUN 12 mg/dL      Creatinine 0.69 mg/dL      Sodium 138 mmol/L      Potassium 4.2 mmol/L  "     Comment: Slight hemolysis detected by analyzer. Result may be falsely elevated.        Chloride 103 mmol/L      CO2 20.8 mmol/L      Calcium 9.0 mg/dL      Total Protein 7.6 g/dL      Albumin 3.5 g/dL      ALT (SGPT) 17 U/L      AST (SGOT) 24 U/L      Comment: Slight hemolysis detected by analyzer. Result may be falsely elevated.        Alkaline Phosphatase 111 U/L      Total Bilirubin 1.2 mg/dL      Globulin 4.1 gm/dL      A/G Ratio 0.9 g/dL      BUN/Creatinine Ratio 17.4     Anion Gap 14.2 mmol/L      eGFR 90.1 mL/min/1.73     Narrative:      GFR Normal >60  Chronic Kidney Disease <60  Kidney Failure <15    The GFR formula is only valid for adults with stable renal function between ages 18 and 70.    C-reactive Protein [[id number]]  (Abnormal) Collected: 12/09/24 1147    Specimen: Blood Updated: 12/09/24 1652     C-Reactive Protein 6.58 mg/dL     CBC Auto Differential [[id number]]  (Abnormal) Collected: 12/09/24 1522    Specimen: Blood from Arm, Left Updated: 12/09/24 1534     WBC 10.17 10*3/mm3      RBC 5.22 10*6/mm3      Hemoglobin 14.3 g/dL      Hematocrit 42.2 %      MCV 80.8 fL      MCH 27.4 pg      MCHC 33.9 g/dL      RDW 14.8 %      RDW-SD 43.2 fl      MPV 9.0 fL      Platelets 321 10*3/mm3      Neutrophil % 73.8 %      Lymphocyte % 16.5 %      Monocyte % 7.7 %      Eosinophil % 1.3 %      Basophil % 0.5 %      Immature Grans % 0.2 %      Neutrophils, Absolute 7.51 10*3/mm3      Lymphocytes, Absolute 1.68 10*3/mm3      Monocytes, Absolute 0.78 10*3/mm3      Eosinophils, Absolute 0.13 10*3/mm3      Basophils, Absolute 0.05 10*3/mm3      Immature Grans, Absolute 0.02 10*3/mm3      nRBC 0.0 /100 WBC     Lactic Acid, Plasma [[id number]]  (Normal) Collected: 12/09/24 1522    Specimen: Blood Updated: 12/09/24 1623     Lactate 1.2 mmol/L     Blood Culture - Blood, Blood, Central Line [[id number]] Collected: 12/09/24 1522    Specimen: Blood, Central Line Updated: 12/09/24 1608    Wound Culture - Swab, Foot, Left  [[id number]] Collected: 12/09/24 1611    Specimen: Swab from Foot, Left Updated: 12/09/24 1616    Blood Culture - Blood, Arm, Left [[id number]] Collected: 12/09/24 1621    Specimen: Blood from Arm, Left Updated: 12/09/24 1624             MRI Foot Left Without Contrast    Result Date: 12/9/2024   PROCEDURE: MRI FOOT LEFT WO CONTRAST-  HISTORY: Rule out osteomyelitis   FINDINGS: Multiplanar MR imaging of the left foot was performed without contrast. There is no evidence of fracture, bone bruise or marrow edema. multiple hammertoe deformities are noted. There is abnormal T1 and T2 signal involving the distal aspect of the second and third proximal phalanges with an appearance consistent with osteomyelitis in these regions. The other bony structures are intact. Mild degenerative changes are noted in the midfoot. There is no evidence of ligamentous injury. there is soft tissue edema or cellulitis involving the midfoot and forefoot. There is a greater degree of soft tissue edema or cellulitis involving the second and third digits. The plantar aponeurosis is intact. No well-defined fluid collection is identified to suggest an abscess.      Impression: Findings consistent with osteomyelitis of the second and third proximal phalanges.  Widespread soft tissue edema or cellulitis, worst involving the second and third digits.    This report was signed and finalized on 12/9/2024 3:29 PM by Conor Sam MD.      XR Foot 3+ View Left    Result Date: 12/9/2024   PROCEDURE: XR FOOT 3+ VW LEFT-  HISTORY: Foot infection  COMPARISON: None.  FINDINGS:  A three view exam demonstrates no acute fracture or dislocation. Only on the AP view is there questionable loss of cortex involving the head of the left fifth proximal phalanx. There is soft tissue swelling of multiple toes. No air identified in the soft tissues. No periosteal reaction is seen. If concern for osteomyelitis, consider three-phase bone scan or foot MRI. There is mild to  moderate soft tissue swelling in the dorsum of the foot. Small plantar calcaneal spur identified.. Degenerative change of multiple tarsal joints identified.       Impression: Question focal cortical bone loss involving the head of the left fifth proximal phalanx but only identified on one view; if concern for osteomyelitis, recommend three-phase bone scan or foot MRI.        This report was signed and finalized on 12/9/2024 12:04 PM by Alicia Parnell MD.          [facility]      Initial impression of presenting illness: Patient is a 74-year-old female without contributing health history.  Presents to the ER today with wounds to left second and third toe.  Patient reports that these wounds have been present for approximately 2 months.  Reports that wounds were caused by her rubbing her feet on her shoe.  Reports that over the last 24 hours started noticing green discharge from third toe.  Patient reports that she has seen podiatrist Dr. Gomes.  Wound culture was completed however results are not back yet.  She denies fever.  Denies OTC medication home remedy.  Denies alleviating or exacerbating factors.    DDX: includes but is not limited to: Osteomyelitis, cellulitis, pressure ulcer or other    Patient arrives stable with vitals interpreted by myself.     Pertinent features from physical exam: There is wounds noted to second and third toe.  There is green discharge and foul odor coming from toes.  Otherwise benign assessment    Initial diagnostic plan: X-ray of left toe.  CBC, CMP, MRI of left foot, lactic acid    Results from initial plan were reviewed and interpreted by me revealing CBC is within appropriate range.  CMP is within appropriate range.  CRP is elevated.  Lactic acid is 1.2.  Left foot x-ray with following impression question focal cortical bone loss involving the head of the left fifth proximal phalanx but it only identified in 1 view.  MRI of left foot with the following impression findings consistent with  osteomyelitis of the second and third proximal phalanges.  Widespread soft tissue edema or cellulitis at worst involving the second and third digits    Diagnostic information from other sources: Chart review    Interventions / Re-evaluation: Vital signs stable throughout encounter.  Will start vancomycin and cefepime.    Results/clinical rationale were discussed with patient    Consultations/Discussion of results with other physicians: Spoke with .  He is agreeable to admit this patient.    Disposition plan: Admit patient to the hospital  -----        Final diagnoses:   Osteomyelitis of left foot, unspecified type          Zoltan Ybarra, APRN  12/09/24 5459

## 2024-12-09 NOTE — H&P
[facility]      Name:  Gloria D Shone   Age:  74 y.o.  Sex:  female  :  1950  MRN:  [id number]   Visit Number:  [id number]  Admission Date:  2024  Date Of Service:  24  Primary Care Physician:  Mohsen Colunga MD    Chief Complaint:     Left foot wound    History Of Presenting Illness:      Ms. Shone is a 74-year-old female with pertinent past medical history of hypothyroidism, obstructive sleep apnea, hypertension, right lower extremity leg wounds followed by wound management who presented today to the emergency department due to worsening left foot wound.  Patient states onset of left foot wound about 3 months ago.  She did see podiatry recently.  Denied fever.  Denies diabetes mellitus type 2.    Upon ED presentation patient hemodynamically stable on room air.  Pertinent labs and imaging noted CRP 6.5, blood cultures obtained, MRI noted findings consistent with osteomyelitis of the second and third proximal phalanges.  Patient given cefepime and vancomycin.  Hospitalist consulted for further medical management.  Podiatry consulted.      Review Of Systems:    All systems were reviewed and negative except as mentioned in history of presenting illness, assessment and plan.    Past Medical History: Patient  has a past medical history of Breast cancer, Hypertension, Hypothyroidism, and Sleep apnea.    Past Surgical History: Patient  has a past surgical history that includes Replacement total knee bilateral; Total hip arthroplasty; Cholecystectomy; Cervical fusion; Tonsillectomy; Breast lumpectomy; Toe Tendon Repair; Finger tendon repair; and Hysterectomy.    Social History: Patient  reports that she has never smoked. She has never been exposed to tobacco smoke. She has never used smokeless tobacco. She reports that she does not currently use alcohol. She reports that she does not use drugs.    Family History:  Patient's family history has been  reviewed and found to be noncontributory.     Allergies:      Chlorpheniramine, Sulfa antibiotics, Sulfate, Hydrocodone, and Morphine    Home Medications:    Prior to Admission Medications       Prescriptions Last Dose Informant Patient Reported? Taking?    acyclovir (ZOVIRAX) 200 MG capsule   Yes No    Take 1 capsule by mouth 2 (Two) Times a Day.    amLODIPine (NORVASC) 5 MG tablet   No No    Take 1 tablet by mouth Daily.    CALCIUM PO   Yes No    Take  by mouth.    cephalexin (KEFLEX) 500 MG capsule   Yes No    Take 1 capsule by mouth 3 (Three) Times a Day.    Coenzyme Q10 (COQ-10 PO)   Yes No    Take  by mouth.    dicyclomine (BENTYL) 20 MG tablet   Yes No    Take 1 tablet by mouth Every 6 (Six) Hours.    hydrALAZINE (APRESOLINE) 10 MG tablet   Yes No    Take 1 tablet by mouth 2 (Two) Times a Day.    levothyroxine (SYNTHROID, LEVOTHROID) 50 MCG tablet   No No    Take 1 tablet by mouth Every Morning. On an empty stomach    loperamide (IMODIUM) 2 MG capsule   No No    Take 1 capsule by mouth 4 (Four) Times a Day As Needed for Diarrhea.    losartan (COZAAR) 100 MG tablet   No No    Take 1 tablet by mouth Daily.    MAGNESIUM ASPARTATE PO   Yes No    Take  by mouth.    Multiple Vitamin (multivitamin) capsule   Yes No    Take 1 capsule by mouth Daily.    Multiple Vitamins-Minerals (b complex-C-E-zinc) tablet   Yes No    Take 1 tablet by mouth Daily.    mupirocin (BACTROBAN) 2 % ointment   Yes No    Apply 1 Application topically to the appropriate area as directed.    NON FORMULARY   Yes No    by Other route. Pt states she is taking D-LIMONENE FLAVOR    Vitamin E 45 MG (100 UNIT) capsule   Yes No    Take 1 capsule by mouth Daily.          ED Medications:    Medications   cefepime 2000 mg IVPB in 100 mL NS (VTB) (has no administration in time range)   vancomycin 2500 mg/500 mL 0.9% NS IVPB ([facility]) (has no administration in time range)     Vital Signs:  Temp:  [97.2 °F (36.2 °C)] 97.2 °F (36.2 °C)  Heart Rate:  []  "92  Resp:  [20] 20  BP: (121-165)/() 165/98        12/09/24  1019   Weight: 119 kg (263 lb)     Body mass index is 43.1 kg/m².    Physical Exam:     Most recent vital Signs: /98   Pulse 92   Temp 97.2 °F (36.2 °C) (Oral)   Resp 20   Ht 166.4 cm (65.5\")   Wt 119 kg (263 lb)   SpO2 98%   BMI 43.10 kg/m²     Physical Exam  Vitals and nursing note reviewed.   Constitutional:       Appearance: She is obese.   HENT:      Head: Normocephalic.      Nose: Nose normal.      Mouth/Throat:      Mouth: Mucous membranes are moist.   Eyes:      Pupils: Pupils are equal, round, and reactive to light.   Cardiovascular:      Rate and Rhythm: Normal rate and regular rhythm.      Pulses: Normal pulses.      Heart sounds: Normal heart sounds.   Pulmonary:      Effort: Pulmonary effort is normal.      Breath sounds: Normal breath sounds.      Comments: Unlabored.  On room air.  Abdominal:      General: Bowel sounds are normal.      Palpations: Abdomen is soft.   Musculoskeletal:         General: Normal range of motion.      Cervical back: Normal range of motion.   Skin:     Comments: Left foot second and third metatarsals with erythema.  Third metatarsal with purulent drainage.   Neurological:      General: No focal deficit present.      Mental Status: She is alert and oriented to person, place, and time.   Psychiatric:         Mood and Affect: Mood normal.       Laboratory data:    I have reviewed the labs done in the emergency room.    Results from last 7 days   Lab Units 12/09/24  1147   SODIUM mmol/L 138   POTASSIUM mmol/L 4.2   CHLORIDE mmol/L 103   CO2 mmol/L 20.8*   BUN mg/dL 12   CREATININE mg/dL 0.69   CALCIUM mg/dL 9.0   BILIRUBIN mg/dL 1.2   ALK PHOS U/L 111   ALT (SGPT) U/L 17   AST (SGOT) U/L 24   GLUCOSE mg/dL 134*     Results from last 7 days   Lab Units 12/09/24  1522   WBC 10*3/mm3 10.17   HEMOGLOBIN g/dL 14.3   HEMATOCRIT % 42.2   PLATELETS 10*3/mm3 321       Radiology:    MRI Foot Left Without " Contrast    Result Date: 12/9/2024   PROCEDURE: MRI FOOT LEFT WO CONTRAST-  HISTORY: Rule out osteomyelitis   FINDINGS: Multiplanar MR imaging of the left foot was performed without contrast. There is no evidence of fracture, bone bruise or marrow edema. multiple hammertoe deformities are noted. There is abnormal T1 and T2 signal involving the distal aspect of the second and third proximal phalanges with an appearance consistent with osteomyelitis in these regions. The other bony structures are intact. Mild degenerative changes are noted in the midfoot. There is no evidence of ligamentous injury. there is soft tissue edema or cellulitis involving the midfoot and forefoot. There is a greater degree of soft tissue edema or cellulitis involving the second and third digits. The plantar aponeurosis is intact. No well-defined fluid collection is identified to suggest an abscess.      Findings consistent with osteomyelitis of the second and third proximal phalanges.  Widespread soft tissue edema or cellulitis, worst involving the second and third digits.    This report was signed and finalized on 12/9/2024 3:29 PM by Conor Sam MD.      XR Foot 3+ View Left    Result Date: 12/9/2024   PROCEDURE: XR FOOT 3+ VW LEFT-  HISTORY: Foot infection  COMPARISON: None.  FINDINGS:  A three view exam demonstrates no acute fracture or dislocation. Only on the AP view is there questionable loss of cortex involving the head of the left fifth proximal phalanx. There is soft tissue swelling of multiple toes. No air identified in the soft tissues. No periosteal reaction is seen. If concern for osteomyelitis, consider three-phase bone scan or foot MRI. There is mild to moderate soft tissue swelling in the dorsum of the foot. Small plantar calcaneal spur identified.. Degenerative change of multiple tarsal joints identified.       Question focal cortical bone loss involving the head of the left fifth proximal phalanx but only identified on  one view; if concern for osteomyelitis, recommend three-phase bone scan or foot MRI.        This report was signed and finalized on 12/9/2024 12:04 PM by Alicia Parnell MD.       Assessment:    Left second and third metatarsal osteomyelitis, POA  Right lower extremity chronic leg wounds followed by wound management  Hypothyroidism  Essential hypertension    Plan:    -Continue vancomycin and cefepime.  -N.p.o. after midnight.  -Podiatry to follow.  -Wound care consulted.  -Home med rec pending.  -Blood cultures obtained.  Further orders pending clinical course.-    Risk Assessment: High  DVT Prophylaxis: SCDS  Code Status: Full  Diet: Cardiac-NPO after midnight.    Advance Care Planning   ACP discussion was declined by the patient. Patient does not have an advance directive, declines further assistance.           Rina Anderson, APRN  12/09/24  16:21 EST    Dictated utilizing Dragon dictation.

## 2024-12-09 NOTE — ED NOTES
Spoke with Hope from lab. Hope states that she will be in there as soon as possible to recollect CBC

## 2024-12-09 NOTE — CASE MANAGEMENT/SOCIAL WORK
Discharge Planning Assessment  [facility]     Patient Name: Gloria D Shone  MRN: [id number]  Today's Date: 12/9/2024    Admit Date: 12/9/2024    Plan: The patient is awake and able to answer questions.  She is a current patient of Dr. Colunga and gets her medications from [facility].  She elects to opt out of Meds to Bed.  She uses a C-pap and cane at home.  She is unable to recall the name of her DME company.  She also has an elevator in her home.  She denies the need for additional DME or services at [place].  At the time of DC the patient plans to return home where she lives alone.  Questions and concerns were addressed, IMM delivered at the time of this conversation. Will provide additional resources and information upon patient request.   Discharge Needs Assessment       Row Name 12/09/24 6113       Living Environment    People in Home alone    Current Living Arrangements home    Duration at Residence 2 years    Potentially Unsafe Housing Conditions none    In the past 12 months has the electric, gas, oil, or water company threatened to shut off services in your home? No    Primary Care Provided by self    Provides Primary Care For no one, unable/limited ability to care for self    Family Caregiver if Needed none    Quality of Family Relationships unable to assess    Able to Return to Prior Arrangements yes       Resource/Environmental Concerns    Resource/Environmental Concerns none    Transportation Concerns none       Transportation Needs    In the past 12 months, has lack of transportation kept you from medical appointments or from getting medications? no    In the past 12 months, has lack of transportation kept you from meetings, work, or from getting things needed for daily living? No       Food Insecurity    Within the past 12 months, you worried that your food would run out before you got the money to buy more. Never true    Within the past 12 months, the food you bought just didn't last and you didn't have  money to get more. Never true       Transition Planning    Patient/Family Anticipates Transition to home    Patient/Family Anticipated Services at Transition none    Transportation Anticipated car, drives self       Discharge Needs Assessment    Readmission Within the Last 30 Days no previous admission in last 30 days    Equipment Currently Used at Home cpap;cane, straight;other (see comments)  elevator    Concerns to be Addressed denies needs/concerns at this time    Do you want help finding or keeping work or a job? I do not need or want help    Do you want help with school or training? For example, starting or completing job training or getting a high school diploma, GED or equivalent No    Anticipated Changes Related to Illness none    Equipment Needed After Discharge none    Provided Post Acute Provider List? N/A    N/A Provider List Comment Patient plans to return home; no needs at this time    Provided Post Acute Provider Quality & Resource List? N/A    N/A Quality & Resource List Comment Patient plans to return home; no needs at this time    Offered/Gave Vendor List no    Current Discharge Risk lives alone                   Discharge Plan       Row Name 12/09/24 3906       Plan    Plan The patient is awake and able to answer questions.  She is a current patient of Dr. Colunga and gets her medications from [facility].  She elects to opt out of Meds to Bed.  She uses a C-pap and cane at home.  She is unable to recall the name of her DME company.  She also has an elevator in her home.  She denies the need for additional DME or services at [place].  At the time of DC the patient plans to return home where she lives alone.  Questions and concerns were addressed, IMM delivered at the time of this conversation. Will provide additional resources and information upon patient request.    Patient/Family in Agreement with Plan unable to assess    Provided Post Acute Provider List? N/A    N/A Provider List Comment Patient  plans to return home; no needs at this time    Provided Post Acute Provider Quality & Resource List? N/A    N/A Quality & Resource List Comment Patient plans to return home; no needs at this time    Plan Comments Patient denies needs at this time    Final Discharge Disposition Code 01 - home or self-care    Final Note Patient plans to return home where she lives alone                  Continued Care and Services - Admitted Since 12/9/2024    No active coordination exists for this encounter.          Demographic Summary       Row Name 12/09/24 1728       General Information    Admission Type inpatient    Arrived From emergency department    Required Notices Provided Important Message from Medicare    Referral Source admission list    Reason for Consult discharge planning    Preferred Language English       Contact Information    Permission Granted to Share Info With                    Functional Status       Row Name 12/09/24 1729       Functional Status    Usual Activity Tolerance good    Current Activity Tolerance moderate       Physical Activity    On average, how many days per week do you engage in moderate to strenuous exercise (like a brisk walk)? 0 days    On average, how many minutes do you engage in exercise at this level? 0 min    Number of minutes of exercise per week 0       Assessment of Health Literacy    How often do you have someone help you read hospital materials? Never    How often do you have problems learning about your medical condition because of difficulty understanding written information? Never    How often do you have a problem understanding what is told to you about your medical condition? Never    How confident are you filling out medical forms by yourself? Extremely    Health Literacy Excellent       Functional Status, IADL    Medications independent    Meal Preparation independent    Housekeeping independent    Laundry independent    Shopping independent    If for any reason  you need help with day-to-day activities such as bathing, preparing meals, shopping, managing finances, etc., do you get the help you need? I don't need any help       Mental Status    General Appearance WDL WDL       Mental Status Summary    Recent Changes in Mental Status/Cognitive Functioning no changes                   Psychosocial       Row Name 12/09/24 1729       Values/Beliefs    Spiritual, Cultural Beliefs, Confucianism Practices, Values that Affect Care no       Behavior WDL    Behavior WDL WDL       Emotion Mood WDL    Emotion/Mood/Affect WDL WDL       Speech WDL    Speech WDL WDL       Perceptual State WDL    Perceptual State WDL WDL       Thought Process WDL    Thought Process WDL WDL       Intellectual Performance WDL    Intellectual Performance WDL WDL                   Abuse/Neglect       Row Name 12/09/24 1729       Personal Safety    Feels Unsafe at Home or Work/School no    Feels Threatened by Someone no    Does Anyone Try to Keep You From Having Contact with Others or Doing Things Outside Your Home? no    Physical Signs of Abuse Present no                   Legal       Row Name 12/09/24 1729       Financial Resource Strain    How hard is it for you to pay for the very basics like food, housing, medical care, and heating? Not hard                   Substance Abuse       Row Name 12/09/24 1729       Substance Use    Substance Use Status current alcohol use    Last Alcohol Use --  Patient reports she may have a couple glasses of wine a few times a month                   Patient Forms       Row Name 12/09/24 1736       Patient Forms    Important Message from Medicare (IMM) Delivered    Delivered to Patient    Method of delivery In person                      Doris Brown RN

## 2024-12-09 NOTE — PROGRESS NOTES
Pharmacokinetic Consult - Cefepime Dosing  Gloria D Shone is a 74 y.o. female who has been consulted to dose Cefepime for osteomyelitis.    Current Antimicrobial Therapy    Anti-Infectives (From admission, onward)      Ordered     Dose/Rate Route Frequency Start Stop    12/09/24 1708  cefepime 2000 mg IVPB in 100 mL NS (VTB)        Ordering Provider: Rina Anderson APRN    2,000 mg  over 4 Hours Intravenous Every 8 Hours 12/10/24 0130 12/15/24 0129    12/09/24 1712  Pharmacy to dose vancomycin        Ordering Provider: Rina Anderson APRN     Not Applicable Continuous PRN 12/09/24 1712 12/14/24 1711    12/09/24 1712  Pharmacy to Dose Cefepime        Ordering Provider: Rina Anderson APRN     Not Applicable Continuous PRN 12/09/24 1711 12/14/24 1710    12/09/24 1604  cefepime 2000 mg IVPB in 100 mL NS (VTB)        Ordering Provider: Zoltan Ybarra APRN    2,000 mg  over 30 Minutes Intravenous Once 12/09/24 1620      12/09/24 1604  vancomycin 2500 mg/500 mL 0.9% NS IVPB ([facility])        Ordering Provider: Zoltan Ybarra APRN    20 mg/kg × 119 kg  over 150 Minutes Intravenous Once 12/09/24 1620              Microbiology Results (last 10 days)       ** No results found for the last 240 hours. **             Allergies  Chlorpheniramine, Sulfa antibiotics, Sulfate, Hydrocodone, and Morphine    Relevant clinical data and objective history reviewed:  Creatinine   Date Value Ref Range Status   12/09/2024 0.69 0.57 - 1.00 mg/dL Final   07/06/2022 0.73 0.52 - 1.04 mg/dL Final     Estimated Creatinine Clearance: 90.3 mL/min (by C-G formula based on SCr of 0.69 mg/dL).  No intake/output data recorded.  Patient weight: 119 kg (263 lb)    Asessment/Plan  Initiate Cefepime 2 gm IV every 8 hours  Pharmacy will monitor Ms. Shone's renal function and clinical status and adjust the Cefepime dose and/or frequency as needed.    Thank you for the consult,     Rupert Webb, VioletaD, BCPS   12/9/2024  17:12 EST

## 2024-12-09 NOTE — PROGRESS NOTES
Pharmacy Consult-Vancomycin Dosing    Gloria D Shone is a  74 y.o. female receiving vancomycin therapy.     Indication: Bone/Joint Infection  Consulting Provider: GABE Anderson    Goal AUC: 400-600 mg/:L*hr    Current Antimicrobial Therapy  Anti-Infectives (From admission, onward)      Ordered     Dose/Rate Route Frequency Start Stop    12/09/24 1847  vancomycin IVPB 2000 mg in 0.9% Sodium Chloride 500 mL        Ordering Provider: Rina Anderson APRN    2,000 mg  250 mL/hr over 120 Minutes Intravenous Every 24 Hours 12/10/24 1700 12/14/24 1659    12/09/24 1708  cefepime 2000 mg IVPB in 100 mL NS (VTB)        Ordering Provider: Rina Anderson APRN    2,000 mg  over 4 Hours Intravenous Every 8 Hours 12/10/24 0130 12/15/24 0129    12/09/24 1712  Pharmacy to dose vancomycin        Ordering Provider: Rina Anderson APRN     Not Applicable Continuous PRN 12/09/24 1712 12/14/24 1711    12/09/24 1712  Pharmacy to Dose Cefepime        Ordering Provider: Rina Andesron APRN     Not Applicable Continuous PRN 12/09/24 1711 12/14/24 1710    12/09/24 1604  cefepime 2000 mg IVPB in 100 mL NS (VTB)        Ordering Provider: Zoltan Ybarra APRN    2,000 mg  over 30 Minutes Intravenous Once 12/09/24 1620 12/09/24 1739    12/09/24 1604  vancomycin 2500 mg/500 mL 0.9% NS IVPB ([facility])        Ordering Provider: Zoltan Ybarra APRN    20 mg/kg × 119 kg  over 150 Minutes Intravenous Once 12/09/24 1620              Labs  Results from last 7 days   Lab Units 12/09/24  1522 12/09/24  1147   WBC 10*3/mm3 10.17  --    CREATININE mg/dL  --  0.69      Estimated Creatinine Clearance: 91.2 mL/min (by C-G formula based on SCr of 0.69 mg/dL).  Temp Readings from Last 1 Encounters:   12/09/24 98.2 °F (36.8 °C) (Oral)       Microbiology Culture results  Microbiology Results (last 10 days)       ** No results found for the last 240 hours. **            Evaluation of Dosing     Last Dose Received in the ED/Outside Facility: No  Is Patient  "on Dialysis or Renal Replacement: No    Ht - 166.4 cm (65.5\")  Wt - 121 kg (266 lb 8.6 oz)    Evaluation of Level                      [facility] AUC Calculation    Current AUC:   514 mg/L*hr    Predicted Steady State AUC on Current Dose: New start  _________________________________    Predicted Steady State AUC on New Dose:   583 mg/L*hr    Assessment/Plan    Pharmacy to dose vancomycin for bone/joint infection. Goal -600 mg/L*hr.  Patient received loading dose of vancomycin 2500mg (~20.7mg/kg) IV on 12/9 @ 1816. Initiate maintenance dose of vancomycin 2000mg (~16.6mg/kg) IV Q24hr on 12/10 @ 1700.  Due to morbid obesity, will obtain two levels after first dose on 12/9 @ 2300 and 12/10 @1600 to assist with dosing.  Pharmacy will continue to monitor renal function, cultures and sensitivities, and clinical status to adjust regimen as necessary.    Thank you for the consult,    Rupert Webb, VioletaD, BCPS   12/09/24 18:48 EST  "

## 2024-12-10 LAB
ANION GAP SERPL CALCULATED.3IONS-SCNC: 11.4 MMOL/L (ref 5–15)
BASOPHILS # BLD AUTO: 0.06 10*3/MM3 (ref 0–0.2)
BASOPHILS NFR BLD AUTO: 0.8 % (ref 0–1.5)
BUN SERPL-MCNC: 14 MG/DL (ref 8–23)
BUN/CREAT SERPL: 16.9 (ref 7–25)
CALCIUM SPEC-SCNC: 9.1 MG/DL (ref 8.6–10.5)
CHLORIDE SERPL-SCNC: 103 MMOL/L (ref 98–107)
CO2 SERPL-SCNC: 26.6 MMOL/L (ref 22–29)
CREAT SERPL-MCNC: 0.83 MG/DL (ref 0.57–1)
DEPRECATED RDW RBC AUTO: 45.3 FL (ref 37–54)
EGFRCR SERPLBLD CKD-EPI 2021: 73.2 ML/MIN/1.73
EOSINOPHIL # BLD AUTO: 0.3 10*3/MM3 (ref 0–0.4)
EOSINOPHIL NFR BLD AUTO: 3.9 % (ref 0.3–6.2)
ERYTHROCYTE [DISTWIDTH] IN BLOOD BY AUTOMATED COUNT: 15 % (ref 12.3–15.4)
ERYTHROCYTE [SEDIMENTATION RATE] IN BLOOD: 24 MM/HR (ref 0–30)
GLUCOSE SERPL-MCNC: 102 MG/DL (ref 65–99)
HCT VFR BLD AUTO: 41.5 % (ref 34–46.6)
HGB BLD-MCNC: 13.5 G/DL (ref 12–15.9)
IMM GRANULOCYTES # BLD AUTO: 0.02 10*3/MM3 (ref 0–0.05)
IMM GRANULOCYTES NFR BLD AUTO: 0.3 % (ref 0–0.5)
LYMPHOCYTES # BLD AUTO: 1.69 10*3/MM3 (ref 0.7–3.1)
LYMPHOCYTES NFR BLD AUTO: 21.7 % (ref 19.6–45.3)
MCH RBC QN AUTO: 26.8 PG (ref 26.6–33)
MCHC RBC AUTO-ENTMCNC: 32.5 G/DL (ref 31.5–35.7)
MCV RBC AUTO: 82.5 FL (ref 79–97)
MONOCYTES # BLD AUTO: 0.73 10*3/MM3 (ref 0.1–0.9)
MONOCYTES NFR BLD AUTO: 9.4 % (ref 5–12)
MRSA DNA SPEC QL NAA+PROBE: NORMAL
NEUTROPHILS NFR BLD AUTO: 4.99 10*3/MM3 (ref 1.7–7)
NEUTROPHILS NFR BLD AUTO: 63.9 % (ref 42.7–76)
NRBC BLD AUTO-RTO: 0 /100 WBC (ref 0–0.2)
PLATELET # BLD AUTO: 351 10*3/MM3 (ref 140–450)
PMV BLD AUTO: 9.4 FL (ref 6–12)
POTASSIUM SERPL-SCNC: 3.9 MMOL/L (ref 3.5–5.2)
RBC # BLD AUTO: 5.03 10*6/MM3 (ref 3.77–5.28)
SODIUM SERPL-SCNC: 141 MMOL/L (ref 136–145)
WBC NRBC COR # BLD AUTO: 7.79 10*3/MM3 (ref 3.4–10.8)

## 2024-12-10 PROCEDURE — 99232 SBSQ HOSP IP/OBS MODERATE 35: CPT | Performed by: NURSE PRACTITIONER

## 2024-12-10 PROCEDURE — 87070 CULTURE OTHR SPECIMN AEROBIC: CPT | Performed by: PODIATRIST

## 2024-12-10 PROCEDURE — 87147 CULTURE TYPE IMMUNOLOGIC: CPT | Performed by: PODIATRIST

## 2024-12-10 PROCEDURE — 80048 BASIC METABOLIC PNL TOTAL CA: CPT | Performed by: NURSE PRACTITIONER

## 2024-12-10 PROCEDURE — 85652 RBC SED RATE AUTOMATED: CPT | Performed by: NURSE PRACTITIONER

## 2024-12-10 PROCEDURE — 85025 COMPLETE CBC W/AUTO DIFF WBC: CPT | Performed by: NURSE PRACTITIONER

## 2024-12-10 PROCEDURE — 25010000002 CEFEPIME PER 500 MG: Performed by: NURSE PRACTITIONER

## 2024-12-10 PROCEDURE — 87205 SMEAR GRAM STAIN: CPT | Performed by: PODIATRIST

## 2024-12-10 PROCEDURE — 87186 SC STD MICRODIL/AGAR DIL: CPT | Performed by: PODIATRIST

## 2024-12-10 RX ADMIN — LOSARTAN POTASSIUM 100 MG: 50 TABLET, FILM COATED ORAL at 09:00

## 2024-12-10 RX ADMIN — DOXYCYCLINE 100 MG: 100 CAPSULE ORAL at 09:00

## 2024-12-10 RX ADMIN — Medication 10 ML: at 21:17

## 2024-12-10 RX ADMIN — HYDRALAZINE HYDROCHLORIDE 10 MG: 10 TABLET ORAL at 09:00

## 2024-12-10 RX ADMIN — DOXYCYCLINE 100 MG: 100 CAPSULE ORAL at 21:12

## 2024-12-10 RX ADMIN — SODIUM CHLORIDE 2000 MG: 9 INJECTION, SOLUTION INTRAVENOUS at 00:53

## 2024-12-10 RX ADMIN — AMLODIPINE BESYLATE 5 MG: 5 TABLET ORAL at 09:00

## 2024-12-10 RX ADMIN — SODIUM CHLORIDE 2000 MG: 9 INJECTION, SOLUTION INTRAVENOUS at 09:00

## 2024-12-10 RX ADMIN — SODIUM CHLORIDE 2000 MG: 9 INJECTION, SOLUTION INTRAVENOUS at 16:43

## 2024-12-10 NOTE — NURSING NOTE
"Attempted to administer patient's medications. Patient refusing all blood pressure medications stating \"I've already taken all my blood pressure medication for the day. I don't take any at night. Explained to patient that her blood pressure was currently 149/108 and her diastolic had been >100 most of the day. Patient continues to refuse any BP medications stating \"I know that's wrong, it's not really that high. I'm not taking anymore blood pressure medication other than what I'm already prescribed at home.\"   "

## 2024-12-10 NOTE — PROGRESS NOTES
[facility] HOSPITALIST    PROGRESS NOTE    Name:  Gloria D Shone   Age:  74 y.o.  Sex:  female  :  1950  MRN:  [id number]   Visit Number:  [id number]  Admission Date:  2024  Date Of Service:  12/10/24  Primary Care Physician:  Mohsen Colunga MD     LOS: 1 day :    Chief Complaint:      Left foot wound    Subjective:    Patient was called from podiatry clinic today.  Stating Cipro was called and for most recent culture growing Pseudomonas/staph aureus/group B strep.  Dr. Luna to see today.  Patient noted to have severe itching of her head after initiation of vancomycin.    Hospital Course:    Ms. Shone is a 74-year-old female with pertinent past medical history of hypothyroidism, obstructive sleep apnea, hypertension, right lower extremity leg wounds followed by wound management who presented today to the emergency department due to worsening left foot wound.  Patient states onset of left foot wound about 3 months ago.  She did see podiatry recently.  Denied fever.  Denies diabetes mellitus type 2.     Upon ED presentation patient hemodynamically stable on room air.  Pertinent labs and imaging noted CRP 6.5, blood cultures obtained, MRI noted findings consistent with osteomyelitis of the second and third proximal phalanges.  Patient given cefepime and vancomycin.  Hospitalist consulted for further medical management.  Patient noted to have severe itching of head after initiation of vancomycin.  Was transitioned to doxycycline.  MRSA PCR negative.  Dr. Luna podiatry consulted.  Wound culture obtained upon admission and pending.    Review of Systems:     All systems were reviewed and negative except as mentioned in subjective, assessment and plan.    Vital Signs:    Temp:  [97.7 °F (36.5 °C)-98.7 °F (37.1 °C)] 97.7 °F (36.5 °C)  Heart Rate:  [] 103  Resp:  [16-18] 18  BP: (121-165)/() 148/83    Intake and output:    No intake/output data recorded.  No intake/output data  "recorded.    Physical Examination:    General Appearance:  Alert and cooperative.  Middle-age female.  No acute distress.  On room air unlabored.   Head:  Atraumatic and normocephalic.   Eyes: Conjunctivae and sclerae normal, no icterus. No pallor.   Throat: No oral lesions, no thrush, oral mucosa moist.   Neck: Supple, trachea midline, no thyromegaly.   Lungs:   Breath sounds heard bilaterally equally.  No wheezing or crackles. No Pleural rub or bronchial breathing.   Heart:  Normal S1 and S2, no murmur, no gallop, no rub. No JVD.   Abdomen:   Normal bowel sounds, no masses, no organomegaly. Soft, nontender, nondistended, no rebound tenderness.   Extremities: Supple, no edema, no cyanosis, no clubbing.  Right lower extremity chronic venous stasis noted.  Left second and third metatarsal with mild erythema/edema/purulent drainage.  Dressing applied.   Skin: No bleeding or rash.   Neurologic: Alert and oriented x 3. No facial asymmetry. Moves all four limbs. No tremors.      Laboratory results:    Results from last 7 days   Lab Units 12/09/24  1147   SODIUM mmol/L 138   POTASSIUM mmol/L 4.2   CHLORIDE mmol/L 103   CO2 mmol/L 20.8*   BUN mg/dL 12   CREATININE mg/dL 0.69   CALCIUM mg/dL 9.0   BILIRUBIN mg/dL 1.2   ALK PHOS U/L 111   ALT (SGPT) U/L 17   AST (SGOT) U/L 24   GLUCOSE mg/dL 134*     Results from last 7 days   Lab Units 12/09/24  1522   WBC 10*3/mm3 10.17   HEMOGLOBIN g/dL 14.3   HEMATOCRIT % 42.2   PLATELETS 10*3/mm3 321             Results from last 7 days   Lab Units 12/09/24  1611   WOUNDCX  Growth present, too young to evaluate     No results for input(s): \"PHART\", \"AGQ6TVN\", \"PO2ART\", \"TAS9IAK\", \"BASEEXCESS\" in the last 8760 hours.   I have reviewed the patient's laboratory results.    Radiology results:    MRI Foot Left Without Contrast    Result Date: 12/9/2024   PROCEDURE: MRI FOOT LEFT WO CONTRAST-  HISTORY: Rule out osteomyelitis   FINDINGS: Multiplanar MR imaging of the left foot was performed " without contrast. There is no evidence of fracture, bone bruise or marrow edema. multiple hammertoe deformities are noted. There is abnormal T1 and T2 signal involving the distal aspect of the second and third proximal phalanges with an appearance consistent with osteomyelitis in these regions. The other bony structures are intact. Mild degenerative changes are noted in the midfoot. There is no evidence of ligamentous injury. there is soft tissue edema or cellulitis involving the midfoot and forefoot. There is a greater degree of soft tissue edema or cellulitis involving the second and third digits. The plantar aponeurosis is intact. No well-defined fluid collection is identified to suggest an abscess.      Impression: Findings consistent with osteomyelitis of the second and third proximal phalanges.  Widespread soft tissue edema or cellulitis, worst involving the second and third digits.    This report was signed and finalized on 12/9/2024 3:29 PM by Conor Sam MD.      XR Foot 3+ View Left    Result Date: 12/9/2024   PROCEDURE: XR FOOT 3+ VW LEFT-  HISTORY: Foot infection  COMPARISON: None.  FINDINGS:  A three view exam demonstrates no acute fracture or dislocation. Only on the AP view is there questionable loss of cortex involving the head of the left fifth proximal phalanx. There is soft tissue swelling of multiple toes. No air identified in the soft tissues. No periosteal reaction is seen. If concern for osteomyelitis, consider three-phase bone scan or foot MRI. There is mild to moderate soft tissue swelling in the dorsum of the foot. Small plantar calcaneal spur identified.. Degenerative change of multiple tarsal joints identified.       Impression: Question focal cortical bone loss involving the head of the left fifth proximal phalanx but only identified on one view; if concern for osteomyelitis, recommend three-phase bone scan or foot MRI.        This report was signed and finalized on 12/9/2024 12:04  PM by Alicia Parnell MD.     I have reviewed the patient's radiology reports.    Medication Review:     I have reviewed the patient's active and prn medications.     Problem List:      Foot osteomyelitis, left      Assessment:    Left second and third metatarsal osteomyelitis, POA  Right lower extremity chronic leg wounds followed by wound management  Hypothyroidism  Essential hypertension    Plan:    -Continue cefepime and doxycycline for now.  MRSA PCR negative.  Patient refusing vancomycin due to severe itching of her scalp.  -Podiatry to follow.  -Wound care consulted.  -Blood cultures negative to date.  -Prior culture outpatient noting strep B/Pseudomonas/staph aureus- likely contamination. Repeat pending.    I have reviewed the copied text and it is accurate as of 12/10/2024     DVT Prophylaxis: SCDS  Code Status: Full  Diet: Cardiac  Discharge Plan: Home in 2 to 3 days.    Rina Anderson, APRN  12/10/24  13:18 EST    Dictated utilizing Dragon dictation.

## 2024-12-10 NOTE — NURSING NOTE
"Patient requesting security go out to her car and bring in her personal belongings (CPAP, clothes, books, briefs). Security states they are unable to go out to patients vehicles. Patient began screaming and crying \"why have I been lied to all day? Everyone told me someone could go out and bring all my things in or I never would have stayed here! If I can't get my things, I'm going home!\" Explained to patient that we can supply her with briefs and I can contact the doctor regarding ordering CPAP for her tonight. Patient refusing to use our supplies stating \"no I know exactly what will happen, I'll be charged for every pull up I use!\" Patient continued to scream at staff. House supervisor called to speak with patient. Patient then assisted out to car by wheelchair with nursing staff and security staff per house supervisors approval to retrieve personal belongings.   "

## 2024-12-10 NOTE — PLAN OF CARE
Goal Outcome Evaluation:  Plan of Care Reviewed With: patient           Outcome Evaluation: Vital signs stable. Dr. Luna seen patient today. Discharge plan pending. Will continue to monitor.

## 2024-12-10 NOTE — PLAN OF CARE
Goal Outcome Evaluation:  Plan of Care Reviewed With: patient      Patient hypertensive at beginning of shift - now refusing any further vital signs. Patient using home CPAP during sleep. IV and PO antibiotics administered per MAR. Patient refused dressing to left foot. NPO since midnight.

## 2024-12-10 NOTE — CASE MANAGEMENT/SOCIAL WORK
Met with pt. She confirms she plans to return home at [place]. Voices concern with foot wounds and hope treatment is effective.

## 2024-12-10 NOTE — NURSING NOTE
"Called to patient's room with patient stating \"my head is itching all over! I'm having a bad reaction to this medicine! You have to stop it right now!\" No signs of rash noted. No other signs of allergic reaction noted. Explained to patient that the medication had been infusing for an hour and if an allergic reaction were to occur, it would have most likely occurred quickly after administration. Patient states \"I know what an allergic reaction is like for me and this is what happens! You have to stop it!\" Medication stopped. Dr. Rai notified. Awaiting further orders.   "

## 2024-12-10 NOTE — NURSING NOTE
Seen for wound consult. Pleasant and cooperative with assessment. Care discussed with Erna SANTIAGO.   Foot wounds open to air. Drainage on floor. Staff reports patient would not allow them to place a dressing. Care discussed with patient. She reports that she follows with a wound clinic in [place]. She is agreeable to betadine dressings to bilateral feet. Dressings applied and orders placed. Awaiting evaluation by Dr. Luna.   Right lower leg is open to air. No open areas or drainage noted. No need for wound care.   She is independent with use of a cane for ambulation. Good bed mobility demonstrated.   Staff to contact provider and re-consult wound nurse for new skin issues or lack of improvement with current orders. Thank you for the consult. If you have questions or concerns do not hesitate to contact me.

## 2024-12-11 ENCOUNTER — HOSPITAL ENCOUNTER (OUTPATIENT)
Facility: HOSPITAL | Age: 74
Discharge: HOME | End: 2024-12-11
Payer: MEDICARE

## 2024-12-11 ENCOUNTER — READMISSION MANAGEMENT (OUTPATIENT)
Dept: CALL CENTER | Facility: HOSPITAL | Age: 74
End: 2024-12-11
Payer: MEDICARE

## 2024-12-11 VITALS
HEART RATE: 103 BPM | SYSTOLIC BLOOD PRESSURE: 148 MMHG | BODY MASS INDEX: 42.84 KG/M2 | WEIGHT: 266.54 LBS | DIASTOLIC BLOOD PRESSURE: 83 MMHG | RESPIRATION RATE: 18 BRPM | TEMPERATURE: 97.6 F | OXYGEN SATURATION: 97 % | HEIGHT: 66 IN

## 2024-12-11 VITALS
HEART RATE: 87 BPM | OXYGEN SATURATION: 98 % | RESPIRATION RATE: 18 BRPM | TEMPERATURE: 97.4 F | SYSTOLIC BLOOD PRESSURE: 127 MMHG | DIASTOLIC BLOOD PRESSURE: 79 MMHG

## 2024-12-11 DIAGNOSIS — M86.172 OTHER ACUTE OSTEOMYELITIS OF LEFT FOOT: Primary | ICD-10-CM

## 2024-12-11 PROCEDURE — 25010000002 CEFEPIME PER 500 MG: Performed by: NURSE PRACTITIONER

## 2024-12-11 PROCEDURE — 25010000003 DEXTROSE 5 % SOLUTION 500 ML FLEX CONT: Performed by: NURSE PRACTITIONER

## 2024-12-11 PROCEDURE — 96365 THER/PROPH/DIAG IV INF INIT: CPT

## 2024-12-11 PROCEDURE — 25010000002 DALBAVANCIN 500 MG RECONSTITUTED SOLUTION 1 EACH VIAL: Performed by: NURSE PRACTITIONER

## 2024-12-11 PROCEDURE — 99239 HOSP IP/OBS DSCHRG MGMT >30: CPT | Performed by: NURSE PRACTITIONER

## 2024-12-11 RX ORDER — DEXTROSE MONOHYDRATE 50 MG/ML
20 INJECTION, SOLUTION INTRAVENOUS ONCE
OUTPATIENT
Start: 2024-12-18

## 2024-12-11 RX ORDER — GENTAMICIN SULFATE 1 MG/G
1 CREAM TOPICAL EVERY 8 HOURS SCHEDULED
Status: DISCONTINUED | OUTPATIENT
Start: 2024-12-11 | End: 2024-12-11 | Stop reason: HOSPADM

## 2024-12-11 RX ORDER — CIPROFLOXACIN 500 MG/1
500 TABLET, FILM COATED ORAL 2 TIMES DAILY
Qty: 28 TABLET | Refills: 0 | Status: SHIPPED | OUTPATIENT
Start: 2024-12-11 | End: 2024-12-25

## 2024-12-11 RX ORDER — DEXTROSE MONOHYDRATE 50 MG/ML
20 INJECTION, SOLUTION INTRAVENOUS ONCE
Status: DISCONTINUED | OUTPATIENT
Start: 2024-12-11 | End: 2024-12-12 | Stop reason: HOSPADM

## 2024-12-11 RX ADMIN — SODIUM CHLORIDE 2000 MG: 9 INJECTION, SOLUTION INTRAVENOUS at 01:38

## 2024-12-11 RX ADMIN — LEVOTHYROXINE SODIUM 50 MCG: 50 TABLET ORAL at 06:17

## 2024-12-11 RX ADMIN — DALBAVANCIN 1500 MG: 500 INJECTION, POWDER, FOR SOLUTION INTRAVENOUS at 14:25

## 2024-12-11 RX ADMIN — Medication 10 ML: at 09:27

## 2024-12-11 NOTE — CASE MANAGEMENT/SOCIAL WORK
Case Management/Social Work    Patient Name:  Gloria D Shone  YOB: 1950  MRN: [id number]  Admit Date:  12/9/2024      Sw contacted outpt infusion and spoke with Ronnie regarding pt getting her first dose of Dalvance.  Ronnie states they are able to get pt in at 130. Team updated.     Electronically signed by:  JASON Rodriguez  12/11/24 08:31 EST

## 2024-12-11 NOTE — CASE MANAGEMENT/SOCIAL WORK
DCP; Home with outpatient infusion for Dalvance. Large Surgical shoe provided to pt to protect her foot wound. Pt will continue outpatient would care. CM continues to follow.

## 2024-12-11 NOTE — DISCHARGE INSTRUCTIONS
Patient to be discharged home.  Follow with wound care.  Follow with podiatry as scheduled.  Follow-up with infusion clinic for Dalvance x 2.  Continue Cipro x 2 weeks.  Return to emergency department for any worsening symptoms.

## 2024-12-11 NOTE — OUTREACH NOTE
Prep Survey      Flowsheet Row Responses   [facility] facility patient discharged from? [place]   Is LACE score < 7 ? Yes   Eligibility [facility]   Date of Admission 12/09/24   Date of Discharge 12/11/24   Discharge Disposition Home or Self Care   Discharge diagnosis Foot osteomyelitis, left   Does the patient have one of the following disease processes/diagnoses(primary or secondary)? Other   Does the patient have Home health ordered? No   Is there a DME ordered? No   Comments regarding appointments [facility] outpt infusion   Prep survey completed? Yes            BRENDA DOMINGUEZ - Registered Nurse

## 2024-12-11 NOTE — DISCHARGE SUMMARY
[facility]   DISCHARGE SUMMARY      Name:  Gloria D Shone   Age:  74 y.o.  Sex:  female  :  1950  MRN:  [id number]   Visit Number:  [id number]    Admission Date:  2024  Date of Discharge:  2024  Primary Care Physician:  Mohsen Colunga MD    Important issues to note:    -Patient admitted with left second and third metatarsal osteomyelitis with wound culture noting group B strep/staph aureus /Pseudomonas.  Was seen by Dr. Luna who recommended Dalvance x 2 with Cipro x 14 days with follow-up outpatient on 1 cultures.  -Patient agreeable with plan of care.  -Follow with PCP and podiatry as scheduled.  -Strict return precautions given.    Discharge Diagnoses:     Left second and third metatarsal osteomyelitis, POA  Right lower extremity chronic leg wounds followed by wound management  Hypothyroidism  Essential hypertension    Problem List:     Active Hospital Problems    Diagnosis  POA    **Foot osteomyelitis, left [M86.9]  Yes      Resolved Hospital Problems   No resolved problems to display.     Presenting Problem:    Chief Complaint   Patient presents with    Wound Infection      Consults:     Consulting Physician(s)         Provider   Role Specialty     Katarina Luna DPM      Consulting Physician Podiatry          Procedures Performed:        History of presenting illness/Hospital Course:    Ms. Shone is a 74-year-old female with pertinent past medical history of hypothyroidism, obstructive sleep apnea, hypertension, right lower extremity leg wounds followed by wound management who presented today to the emergency department due to worsening left foot wound.  Patient states onset of left foot wound about 3 months ago.  She did see podiatry recently.  Denied fever.  Denies diabetes mellitus type 2.     Upon ED presentation patient hemodynamically stable on room air.  Pertinent labs and imaging noted CRP 6.5, blood cultures obtained, MRI noted findings consistent  with osteomyelitis of the second and third proximal phalanges.  Patient given cefepime and vancomycin.  Hospitalist consulted for further medical management.  Patient noted to have severe itching of head after initiation of vancomycin.  Was transitioned to doxycycline.  MRSA PCR negative.  Dr. Luna podiatry consulted.  Wound culture obtained upon admission and noted Staph aureus with group B strep.  Prior culture at podiatry office also noting Pseudomonas.  Dr. Luna updated on culture results.  Per Dr. Luna will initiate Dalvance outpatient infusion x 2, Cipro x 14 days, will follow-up podiatry office on final wound culture.  Patient updated on plan of care with all questions answered.    Upon discharge patient stating that her right arm feels funny.'s feels as if she is not able to fully lift her right arm.  Onset yesterday intermittently.  Denies any other concerns such as pain, swelling, erythema, slurred speech, confusion.  Advised workup for possible stroke with MRI of brain.  Patient refused at this time, stating that she feels that it is if right arm weakness may be related to recent IV attempts.  Education given, again advised further imaging to rule out stroke.  Patient refused.  Advised if worsening symptoms to return to ED for which she is agreeable.  Patient currently alert and oriented x 3.    Vital Signs:    Temp:  [97.6 °F (36.4 °C)] 97.6 °F (36.4 °C)  Resp:  [18] 18    Physical Exam:    General Appearance:  Alert and cooperative.  Chronically ill middle-age female.   Head:  Atraumatic and normocephalic.   Eyes: Conjunctivae and sclerae normal, no icterus. No pallor.   Ears:  Ears with no abnormalities noted.   Throat: No oral lesions, no thrush, oral mucosa moist.   Neck: Supple, trachea midline, no thyromegaly.   Back:   No kyphoscoliosis present. No tenderness to palpation.   Lungs:   Breath sounds heard bilaterally equally.  No crackles or wheezing. No Pleural rub or bronchial breathing.    Heart:  Normal S1 and S2, no murmur, no gallop, no rub. No JVD.   Abdomen:   Normal bowel sounds, no masses, no organomegaly. Soft, nontender, nondistended, no rebound tenderness.   Extremities: Supple, no edema, no cyanosis, no clubbing.  Equal .  No drift noted.   Pulses: Pulses palpable bilaterally.   Skin: No bleeding or rash.  Right lower extremity scaliness noted with no open areas.  Left second and third metatarsals noted with erythema and scant purulent drainage.   Neurologic: Alert and oriented x 3. No facial asymmetry. Moves all four limbs. No tremors.     Pertinent Lab Results:     Results from last 7 days   Lab Units 12/10/24  1618 12/09/24  1147   SODIUM mmol/L 141 138   POTASSIUM mmol/L 3.9 4.2   CHLORIDE mmol/L 103 103   CO2 mmol/L 26.6 20.8*   BUN mg/dL 14 12   CREATININE mg/dL 0.83 0.69   CALCIUM mg/dL 9.1 9.0   BILIRUBIN mg/dL  --  1.2   ALK PHOS U/L  --  111   ALT (SGPT) U/L  --  17   AST (SGOT) U/L  --  24   GLUCOSE mg/dL 102* 134*     Results from last 7 days   Lab Units 12/10/24  1618 12/09/24  1522   WBC 10*3/mm3 7.79 10.17   HEMOGLOBIN g/dL 13.5 14.3   HEMATOCRIT % 41.5 42.2   PLATELETS 10*3/mm3 351 321                             Results from last 7 days   Lab Units 12/10/24  1710 12/09/24  1621 12/09/24  1611 12/09/24  1522   BLOODCX   --  No growth at 24 hours  --  No growth at 24 hours   WOUNDCX  Growth present, too young to evaluate  --  Light growth (2+) Staphylococcus aureus*  Light growth (2+) Streptococcus agalactiae (Group B)*  Rare growth Gram Negative Bacilli*  --        Pertinent Radiology Results:    Imaging Results (All)       Procedure Component Value Units Date/Time    MRI Foot Left Without Contrast [[id number]] Collected: 12/09/24 1526     Updated: 12/09/24 1531    Narrative:         PROCEDURE: MRI FOOT LEFT WO CONTRAST-     HISTORY: Rule out osteomyelitis        FINDINGS: Multiplanar MR imaging of the left foot was performed without  contrast. There is no evidence  of fracture, bone bruise or marrow edema.  multiple hammertoe deformities are noted. There is abnormal T1 and T2  signal involving the distal aspect of the second and third proximal  phalanges with an appearance consistent with osteomyelitis in these  regions. The other bony structures are intact. Mild degenerative changes  are noted in the midfoot. There is no evidence of ligamentous injury.  there is soft tissue edema or cellulitis involving the midfoot and  forefoot. There is a greater degree of soft tissue edema or cellulitis  involving the second and third digits. The plantar aponeurosis is  intact. No well-defined fluid collection is identified to suggest an  abscess.       Impression:      Findings consistent with osteomyelitis of the second and  third proximal phalanges.     Widespread soft tissue edema or cellulitis, worst involving the second  and third digits.           This report was signed and finalized on 12/9/2024 3:29 PM by Conor Sam MD.       XR Foot 3+ View Left [[id number]] Collected: 12/09/24 1201     Updated: 12/09/24 1206    Narrative:         PROCEDURE: XR FOOT 3+ VW LEFT-     HISTORY: Foot infection     COMPARISON: None.     FINDINGS:  A three view exam demonstrates no acute fracture or  dislocation. Only on the AP view is there questionable loss of cortex  involving the head of the left fifth proximal phalanx. There is soft  tissue swelling of multiple toes. No air identified in the soft tissues.  No periosteal reaction is seen. If concern for osteomyelitis, consider  three-phase bone scan or foot MRI. There is mild to moderate soft tissue  swelling in the dorsum of the foot. Small plantar calcaneal spur  identified.. Degenerative change of multiple tarsal joints identified.          Impression:      Question focal cortical bone loss involving the head of the  left fifth proximal phalanx but only identified on one view; if concern  for osteomyelitis, recommend three-phase bone scan or  foot MRI.                       This report was signed and finalized on 12/9/2024 12:04 PM by Alicia Parnell MD.               Echo:    Results for orders placed in visit on 08/08/24    Adult Transthoracic Echo Complete W/ Cont if Necessary Per Protocol    Interpretation Summary  1.  Normal left ventricular size and systolic function, LVEF 60-65%.  2.  Normal LV diastolic filling pattern.  3.  Normal right ventricular size and systolic function.  4.  Normal left atrial volume index.  5.  No significant valvular abnormalities.    Condition on Discharge:      Stable.    Code status during the hospital stay:    Code Status and Medical Interventions: CPR (Attempt to Resuscitate); Full Support   Ordered at: 12/09/24 2909     Level Of Support Discussed With:    Patient     Code Status (Patient has no pulse and is not breathing):    CPR (Attempt to Resuscitate)     Medical Interventions (Patient has pulse or is breathing):    Full Support     Discharge Disposition:    Home or Self Care    Discharge Medications:       Discharge Medications        New Medications        Instructions Start Date   ciprofloxacin 500 MG tablet  Commonly known as: Cipro   500 mg, Oral, 2 Times Daily      dalbavancin 1500 mg/500 mL solution  Commonly known as: DALVANCE   1,500 mg, Intravenous, Weekly             Continue These Medications        Instructions Start Date   amLODIPine 5 MG tablet  Commonly known as: NORVASC   5 mg, Oral, Daily      CALCIUM PO   Take  by mouth.      COQ-10 PO   Take  by mouth.      dicyclomine 20 MG tablet  Commonly known as: BENTYL   20 mg, Every 6 Hours      hydrALAZINE 10 MG tablet  Commonly known as: APRESOLINE   10 mg, 2 Times Daily      levothyroxine 50 MCG tablet  Commonly known as: SYNTHROID, LEVOTHROID   50 mcg, Oral, Every Early Morning, On an empty stomach      loperamide 2 MG capsule  Commonly known as: IMODIUM   2 mg, Oral, 4 Times Daily PRN      losartan 100 MG tablet  Commonly known as: COZAAR   100 mg,  Oral, Daily      MAGNESIUM ASPARTATE PO   Take  by mouth.      multivitamin capsule   1 capsule, Daily      NON FORMULARY   by Other route. Pt states she is taking D-LIMONENE FLAVOR      Vitamin E 45 MG (100 UNIT) capsule   100 Units, Daily             Stop These Medications      b complex-C-E-zinc tablet     mupirocin 2 % ointment  Commonly known as: BACTROBAN            Discharge Diet:     Diet Instructions       Diet: Cardiac Diets; Healthy Heart (2-3 Na+); Thin (IDDSI 0)      Discharge Diet: Cardiac Diets    Cardiac Diet: Healthy Heart (2-3 Na+)    Fluid Consistency: Thin (IDDSI 0)    Diet: Cardiac Diets; Healthy Heart (2-3 Na+); Thin (IDDSI 0)      Discharge Diet: Cardiac Diets    Cardiac Diet: Healthy Heart (2-3 Na+)    Fluid Consistency: Thin (IDDSI 0)          Activity at Discharge:     Activity Instructions       Activity as Tolerated      Activity as Tolerated            Follow-up Appointments:     Follow-up Information       Mohsen Colunga MD. Go on 12/19/2024.    Specialty: Family Medicine  Why: @ 11:15am  Contact information:  [address] [place] [address]  [phone]               Katarina Luna DPM. Go on 12/17/2024.    Specialty: Podiatry  Why: @ 9:00am  Contact information:  [address] [place] [address] [address] [place] [address]  [phone]                           Future Appointments   Date Time Provider Department Center   12/11/2024  2:00 PM TREATMENT RM [facility]  [facility]   12/19/2024 11:15 AM Mohsen Colunga MD [facility]   1/2/2025  1:00 PM Mohsen Colunga MD [facility]   1/16/2025  2:00 PM Poncho Baron APRN [facility]     Test Results Pending at Discharge:    Pending Results       None                 MORA Tovar  12/11/24  12:18 EST    Time: I spent >30 minutes on this discharge activity which included: face-to-face encounter with the patient, reviewing the data in the system, coordination of the care with the nursing staff as well as  consultants, documentation, and entering orders.     Dictated utilizing Dragon dictation.

## 2024-12-11 NOTE — PLAN OF CARE
Problem: Adult Inpatient Plan of Care  Goal: Plan of Care Review  Outcome: Progressing  Goal: Patient-Specific Goal (Individualized)  Outcome: Progressing  Goal: Absence of Hospital-Acquired Illness or Injury  Outcome: Progressing  Intervention: Identify and Manage Fall Risk  Recent Flowsheet Documentation  Taken 12/11/2024 0000 by Hilaria Hall RN  Safety Promotion/Fall Prevention: safety round/check completed  Taken 12/10/2024 2200 by Hilaria Hall RN  Safety Promotion/Fall Prevention: safety round/check completed  Taken 12/10/2024 2000 by Hilaria Hall RN  Safety Promotion/Fall Prevention: safety round/check completed  Intervention: Prevent Skin Injury  Recent Flowsheet Documentation  Taken 12/11/2024 0000 by Hilaria Hall RN  Body Position:   tilted   right  Taken 12/10/2024 2200 by Hilaria Hall RN  Body Position: supine  Taken 12/10/2024 2000 by Hilaria Hall RN  Body Position: sitting up in bed  Goal: Optimal Comfort and Wellbeing  Outcome: Progressing  Goal: Readiness for Transition of Care  Outcome: Progressing     Problem: Wound  Goal: Optimal Coping  Outcome: Progressing  Goal: Optimal Functional Ability  Outcome: Progressing  Intervention: Optimize Functional Ability  Recent Flowsheet Documentation  Taken 12/11/2024 0000 by Hilaria Hall RN  Activity Management: activity encouraged  Activity Assistance Provided: assistance, stand-by  Taken 12/10/2024 2200 by Hilaria Hall RN  Activity Management: activity encouraged  Activity Assistance Provided: assistance, stand-by  Taken 12/10/2024 2000 by Hilaria Hall RN  Activity Management: up in chair  Activity Assistance Provided: assistance, stand-by  Goal: Absence of Infection Signs and Symptoms  Outcome: Progressing  Goal: Improved Oral Intake  Outcome: Progressing  Goal: Optimal Pain Control and Function  Outcome: Progressing  Goal: Skin Health and Integrity  Outcome: Progressing  Intervention: Optimize Skin Protection  Recent Flowsheet  Documentation  Taken 12/11/2024 0000 by Hilaria Hall, RN  Activity Management: activity encouraged  Head of Bed (HOB) Positioning: HOB elevated  Taken 12/10/2024 2200 by Hilaria Hall RN  Activity Management: activity encouraged  Head of Bed (HOB) Positioning: HOB elevated  Taken 12/10/2024 2000 by Hilaria Hall, RN  Activity Management: up in chair  Head of Bed (HOB) Positioning: HOB elevated  Goal: Optimal Wound Healing  Outcome: Progressing     Problem: Comorbidity Management  Goal: Blood Pressure in Desired Range  Outcome: Progressing  Goal: Bariatric Home Regimen Maintained  Outcome: Progressing     Problem: Fall Injury Risk  Goal: Absence of Fall and Fall-Related Injury  Outcome: Progressing  Intervention: Promote Injury-Free Environment  Recent Flowsheet Documentation  Taken 12/11/2024 0000 by Hilaria Hall RN  Safety Promotion/Fall Prevention: safety round/check completed  Taken 12/10/2024 2200 by Hilaria Hall RN  Safety Promotion/Fall Prevention: safety round/check completed  Taken 12/10/2024 2000 by Hilaria Hall, RN  Safety Promotion/Fall Prevention: safety round/check completed   Goal Outcome Evaluation:

## 2024-12-11 NOTE — CONSULTS
Inpatient Podiatry Consult  Consult performed by: Katarina Luna DPM  Consult ordered by: Rina Anderson APRN          Patient Identification:  Name:  Gloria D Shone  Age:  74 y.o.  Sex:  female  :  1950  MRN:  [id number]  Visit Number:  [id number]  Primary care provider:  Mohsen Colunga MD    Chief complaint: left foot osteomyelitis    History of presenting illness:  Ms. Shone is a 74-year-old female with past medical history of hypothyroidism, obstructive sleep apnea, hypertension, bilateral lower extremity lymphedema with regular use of home lymphedema pumps, who presented today to the emergency department yesterday due to worsening left foot wound. She has had a wound on the top of her left 2nd and 3rd toes for 1-2 months. She was seeing her PCP for these and saw a podiatrist, Dr. Gomes, last week. She has been admitted for IV antibiotics related to cellulitis LLE and osteomyelitis in the 2nd and 3rd toes. She has minimal pain due to neuropathy.  ---------------------------------------------------------------------------------------------------------------------  Review of Systems:   Constitution: No chills, no rigors, no unexplained weight loss or weight gain  Respiratory: No cough, no hemoptysis  Cardiovascular: regular rate and rhythm  Gastrointestinal: No nausea, no vomiting, no hematemesis, no diarrhea or constipation, no melena  Integument: No pruritis and  no skin rash  Musculoskeletal: No joint pain, joint stiffness, joint swelling, muscle pain, muscle weakness and neck pain  Neurological: No dizziness, headaches, light headedness, seizures and vertigo  Endocrine: No frequent urination and nocturia, temperature intolerance, weight gain, unintended and weight loss, unintended  ---------------------------------------------------------------------------------------------------------------------   Past History:  Family History   Problem Relation Age of Onset    Kidney failure Mother      Heart disease Father     Heart attack Father     Heart attack Paternal Uncle     Heart attack Maternal Grandfather     Heart attack Paternal Grandfather      Past Medical History:   Diagnosis Date    Breast cancer     Hypertension     Hypothyroidism     Sleep apnea      Past Surgical History:   Procedure Laterality Date    BREAST LUMPECTOMY      CERVICAL FUSION      CHOLECYSTECTOMY      FINGER TENDON REPAIR      HYSTERECTOMY      REPLACEMENT TOTAL KNEE BILATERAL      TOE TENDON REPAIR      TONSILLECTOMY      TOTAL HIP ARTHROPLASTY       Social History     Socioeconomic History    Marital status:    Tobacco Use    Smoking status: Never     Passive exposure: Never    Smokeless tobacco: Never   Vaping Use    Vaping status: Never Used   Substance and Sexual Activity    Alcohol use: Not Currently     Comment: social    Drug use: Never    Sexual activity: Defer     ---------------------------------------------------------------------------------------------------------------------   Allergies:  Chlorpheniramine, Sulfa antibiotics, Sulfate, Hydrocodone, and Morphine  ---------------------------------------------------------------------------------------------------------------------   Prior to Admission Medications       Prescriptions Last Dose Informant Patient Reported? Taking?    amLODIPine (NORVASC) 5 MG tablet   No No    Take 1 tablet by mouth Daily.    CALCIUM PO   Yes No    Take  by mouth.    Coenzyme Q10 (COQ-10 PO)   Yes No    Take  by mouth.    dicyclomine (BENTYL) 20 MG tablet   Yes No    Take 1 tablet by mouth Every 6 (Six) Hours.    hydrALAZINE (APRESOLINE) 10 MG tablet   Yes No    Take 1 tablet by mouth 2 (Two) Times a Day.    levothyroxine (SYNTHROID, LEVOTHROID) 50 MCG tablet   No No    Take 1 tablet by mouth Every Morning. On an empty stomach    loperamide (IMODIUM) 2 MG capsule   No No    Take 1 capsule by mouth 4 (Four) Times a Day As Needed for Diarrhea.    losartan (COZAAR) 100 MG tablet   No No     Take 1 tablet by mouth Daily.    MAGNESIUM ASPARTATE PO   Yes No    Take  by mouth.    Multiple Vitamin (multivitamin) capsule   Yes No    Take 1 capsule by mouth Daily.    Multiple Vitamins-Minerals (b complex-C-E-zinc) tablet   Yes No    Take 1 tablet by mouth Daily.    mupirocin (BACTROBAN) 2 % ointment   Yes No    Apply 1 Application topically to the appropriate area as directed.    NON FORMULARY   Yes No    by Other route. Pt states she is taking D-LIMONENE FLAVOR    Vitamin E 45 MG (100 UNIT) capsule   Yes No    Take 1 capsule by mouth Daily.          [facility] Meds:  amLODIPine, 5 mg, Oral, Daily  hydrALAZINE, 10 mg, Oral, BID  levothyroxine, 50 mcg, Oral, Q AM  losartan, 100 mg, Oral, Daily  sodium chloride, 10 mL, Intravenous, Q12H      Pharmacy to Dose Cefepime,       ---------------------------------------------------------------------------------------------------------------------   Vital Signs:  Temp:  [97.6 °F (36.4 °C)] 97.6 °F (36.4 °C)  Resp:  [18] 18      12/09/24  1019 12/09/24  1747   Weight: 119 kg (263 lb) 121 kg (266 lb 8.6 oz)     Body mass index is 43.68 kg/m².  ---------------------------------------------------------------------------------------------------------------------   Physical exam:  Vascular: 2/4 DP and 1/4 PT right and left. Brisk capillary refill all digits. Diminished hair growth. Skin temperature warm to cool gradient proximal leg to foot. Thin shiny skin discoloration. Varicosities absent. +2 pitting edema right, +3 left.    Neurological: Vibratory, light touch, and proprioception diminished bilateral feet.    Dermatological: Dorsal left 2nd PIPJ superficial ulceration 0.5cm diameter with minimal local erythema and edema  Ulcer:   Location: dorsal left 3rd PIPJ  Size: 1cm x 0.8cm, exposed capsule  Base: fibrotic  Drainage: mild serosanguinous  Periwound: Hyperkeratosis, maceration to entirety of dorsal toe with green discoloration, ascending cellulitis, edema to lower  "leg    Ulcer plantar right hallux, superficial, 0.5cm diameter, no deep probing, no signs of infection, scant drainage    Musculoskeletal: MMT 5/5 GS/FHL/TA. Pain on palpation left 2nd and 3rd toe. All compartments soft and compressible. No pain or crepitus with ROM bilateral foot and ankle. Mild Gastrosoleus contracture. Hammertoes 2-5 bilateral foot. No gross deformity of the bilateral lower extremities    ---------------------------------------------------------------------------------------------------------------------   Results from last 7 days   Lab Units 12/10/24  1618 12/09/24  1522 12/09/24  1147   CRP mg/dL  --   --  6.58*   LACTATE mmol/L  --  1.2  --    WBC 10*3/mm3 7.79 10.17  --    HEMOGLOBIN g/dL 13.5 14.3  --    HEMATOCRIT % 41.5 42.2  --    MCV fL 82.5 80.8  --    MCHC g/dL 32.5 33.9  --    PLATELETS 10*3/mm3 351 321  --          Results from last 7 days   Lab Units 12/10/24  1618 12/09/24  1147   SODIUM mmol/L 141 138   POTASSIUM mmol/L 3.9 4.2   CHLORIDE mmol/L 103 103   CO2 mmol/L 26.6 20.8*   BUN mg/dL 14 12   CREATININE mg/dL 0.83 0.69   CALCIUM mg/dL 9.1 9.0   GLUCOSE mg/dL 102* 134*   ALBUMIN g/dL  --  3.5   BILIRUBIN mg/dL  --  1.2   ALK PHOS U/L  --  111   AST (SGOT) U/L  --  24   ALT (SGPT) U/L  --  17   Estimated Creatinine Clearance: 75.8 mL/min (by C-G formula based on SCr of 0.83 mg/dL).  No results found for: \"AMMONIA\"          Lab Results   Component Value Date    HGBA1C 6.00 (H) 12/09/2024     Lab Results   Component Value Date    TSH 1.020 09/23/2024    FREET4 1.15 09/23/2024     No results found for: \"PREGTESTUR\", \"PREGSERUM\", \"HCG\", \"HCGQUANT\"  Pain Management Panel           No data to display              Blood Culture   Date Value Ref Range Status   12/09/2024 No growth at 24 hours  Preliminary   12/09/2024 No growth at 24 hours  Preliminary     No results found for: \"URINECX\"  Wound Culture   Date Value Ref Range Status   12/10/2024 Growth present, too young to evaluate  " "Preliminary   12/09/2024 Light growth (2+) Staphylococcus aureus (A)  Preliminary   12/09/2024 (A)  Preliminary    Light growth (2+) Streptococcus agalactiae (Group B)     Comment:       This organism is considered to be universally susceptible to penicillin.  No further antibiotic testing will be performed. If Clindamycin or Erythromycin is the drug of choice, notify the laboratory within 7 days to request susceptibility testing.   12/09/2024 Rare growth Gram Negative Bacilli (A)  Preliminary     No results found for: \"STOOLCX\"      ---------------------------------------------------------------------------------------------------------------------   Imaging Results (Last 7 Days)       Procedure Component Value Units Date/Time    MRI Foot Left Without Contrast [[id number]] Collected: 12/09/24 1526     Updated: 12/09/24 1531    Narrative:         PROCEDURE: MRI FOOT LEFT WO CONTRAST-     HISTORY: Rule out osteomyelitis        FINDINGS: Multiplanar MR imaging of the left foot was performed without  contrast. There is no evidence of fracture, bone bruise or marrow edema.  multiple hammertoe deformities are noted. There is abnormal T1 and T2  signal involving the distal aspect of the second and third proximal  phalanges with an appearance consistent with osteomyelitis in these  regions. The other bony structures are intact. Mild degenerative changes  are noted in the midfoot. There is no evidence of ligamentous injury.  there is soft tissue edema or cellulitis involving the midfoot and  forefoot. There is a greater degree of soft tissue edema or cellulitis  involving the second and third digits. The plantar aponeurosis is  intact. No well-defined fluid collection is identified to suggest an  abscess.       Impression:      Findings consistent with osteomyelitis of the second and  third proximal phalanges.     Widespread soft tissue edema or cellulitis, worst involving the second  and third digits.           This report " was signed and finalized on 12/9/2024 3:29 PM by Conor Sam MD.       XR Foot 3+ View Left [[id number]] Collected: 12/09/24 1201     Updated: 12/09/24 1206    Narrative:         PROCEDURE: XR FOOT 3+ VW LEFT-     HISTORY: Foot infection     COMPARISON: None.     FINDINGS:  A three view exam demonstrates no acute fracture or  dislocation. Only on the AP view is there questionable loss of cortex  involving the head of the left fifth proximal phalanx. There is soft  tissue swelling of multiple toes. No air identified in the soft tissues.  No periosteal reaction is seen. If concern for osteomyelitis, consider  three-phase bone scan or foot MRI. There is mild to moderate soft tissue  swelling in the dorsum of the foot. Small plantar calcaneal spur  identified.. Degenerative change of multiple tarsal joints identified.          Impression:      Question focal cortical bone loss involving the head of the  left fifth proximal phalanx but only identified on one view; if concern  for osteomyelitis, recommend three-phase bone scan or foot MRI.                       This report was signed and finalized on 12/9/2024 12:04 PM by Alicia Parnell MD.             ----------------------------------------------------------------------------------------------------------------------  Assessment and Plan:  74 year old female with osteomyelitis left foot 2nd and 3rd proximal phalanges related to diabetic neuropathic ulcerations; cellulitis LLE    -Deep culture taken of 3rd toe ulceration  -Previous cultures growing Staph, Strep B, Pseudomonas    -Discussed condition and treatment options thoroughly with patient. Staph and Strep most likely source of osteomyelitis. Will treat with 1500mg IV Dalvance, two doses, one week apart. Pseudomonas coverage with topical gentamicin cream and 14 days oral ciprofloxacin.    Will follow clinically and radiographically closely as an outpatient.    Patient can follow up as an outpatient in one week.  [facility] [phone].      Thank you for the consult.    Katarina Luna DPM  12/11/24  12:24 EST

## 2024-12-11 NOTE — CASE MANAGEMENT/SOCIAL WORK
Case Management Discharge Note      Final Note: Pt discharged home via private car. Surgical shoe provided prior to discharge. Pt will be going to the [facility] infusion center right after discharge for her first dose of dalvance. Pt denied any other needs at discharge.    Provided Post Acute Provider List?: N/A  N/A Provider List Comment: Patient plans to return home; no needs at this time  Provided Post Acute Provider Quality & Resource List?: N/A  N/A Quality & Resource List Comment: Patient plans to return home; no needs at this time    Selected Continued Care - Discharged on 12/11/2024 Admission date: 12/9/2024 - Discharge disposition: Home or Self Care      Destination    No services have been selected for the patient.                Durable Medical Equipment    No services have been selected for the patient.                Dialysis/Infusion Coordination complete.      Service Provider Services Address Phone Fax Patient Preferred    [facility] [phone] [phone] --              Home Medical Care    No services have been selected for the patient.                Therapy    No services have been selected for the patient.                Community Resources    No services have been selected for the patient.                Community & DME    No services have been selected for the patient.                    Transportation Services  Private: Car    Final Discharge Disposition Code: 01 - home or self-care

## 2024-12-12 ENCOUNTER — TRANSITIONAL CARE MANAGEMENT TELEPHONE ENCOUNTER (OUTPATIENT)
Dept: CALL CENTER | Facility: HOSPITAL | Age: 74
End: 2024-12-12
Payer: MEDICARE

## 2024-12-12 NOTE — OUTREACH NOTE
Call Center TCM Note      Flowsheet Row Responses   [facility] facility patient discharged from? [facility]   Does the patient have one of the following disease processes/diagnoses(primary or secondary)? Other   TCM attempt successful? No  [No one listed on VR]   Unsuccessful attempts Attempt 1            Maryann Sapp RN    12/12/2024, 15:36 EST

## 2024-12-12 NOTE — OUTREACH NOTE
Call Center TCM Note      Flowsheet Row Responses   [facility] patient discharged from? [facility]   Does the patient have one of the following disease processes/diagnoses(primary or secondary)? Other   TCM attempt successful? No   Unsuccessful attempts Attempt 2   Call Status Left message            Maryann Sapp RN    12/12/2024, 16:27 EST

## 2024-12-13 ENCOUNTER — TRANSITIONAL CARE MANAGEMENT TELEPHONE ENCOUNTER (OUTPATIENT)
Dept: CALL CENTER | Facility: HOSPITAL | Age: 74
End: 2024-12-13
Payer: MEDICARE

## 2024-12-13 LAB
BACTERIA SPEC AEROBE CULT: ABNORMAL
GRAM STN SPEC: ABNORMAL

## 2024-12-13 NOTE — OUTREACH NOTE
Call Center TCM Note      Flowsheet Row Responses   [facility] patient discharged from? [facility]   Does the patient have one of the following disease processes/diagnoses(primary or secondary)? Other   TCM attempt successful? No   Unsuccessful attempts Attempt 3            Christine Espinal RN    12/13/2024, 09:17 EST

## 2024-12-14 LAB
BACTERIA SPEC AEROBE CULT: ABNORMAL
BACTERIA SPEC AEROBE CULT: ABNORMAL
BACTERIA SPEC AEROBE CULT: NORMAL
BACTERIA SPEC AEROBE CULT: NORMAL
GRAM STN SPEC: ABNORMAL
GRAM STN SPEC: ABNORMAL

## 2024-12-15 DIAGNOSIS — K52.9 CHRONIC DIARRHEA: ICD-10-CM

## 2024-12-16 RX ORDER — LOPERAMIDE HYDROCHLORIDE 2 MG/1
CAPSULE ORAL
Qty: 120 CAPSULE | Refills: 0 | Status: SHIPPED | OUTPATIENT
Start: 2024-12-16

## 2024-12-18 ENCOUNTER — HOSPITAL ENCOUNTER (OUTPATIENT)
Facility: HOSPITAL | Age: 74
Discharge: HOME | End: 2024-12-18
Payer: MEDICARE

## 2024-12-18 ENCOUNTER — TELEPHONE (OUTPATIENT)
Dept: FAMILY MEDICINE CLINIC | Facility: CLINIC | Age: 74
End: 2024-12-18

## 2024-12-18 DIAGNOSIS — M86.172 OTHER ACUTE OSTEOMYELITIS OF LEFT FOOT: Primary | ICD-10-CM

## 2024-12-18 PROCEDURE — 25010000002 DALBAVANCIN 500 MG RECONSTITUTED SOLUTION 1 EACH VIAL: Performed by: NURSE PRACTITIONER

## 2024-12-18 PROCEDURE — 96365 THER/PROPH/DIAG IV INF INIT: CPT

## 2024-12-18 PROCEDURE — 25010000003 DEXTROSE 5 % SOLUTION 500 ML FLEX CONT: Performed by: NURSE PRACTITIONER

## 2024-12-18 RX ORDER — DEXTROSE MONOHYDRATE 50 MG/ML
20 INJECTION, SOLUTION INTRAVENOUS ONCE
OUTPATIENT
Start: 2024-12-18

## 2024-12-18 RX ORDER — DEXTROSE MONOHYDRATE 50 MG/ML
20 INJECTION, SOLUTION INTRAVENOUS ONCE
Status: DISCONTINUED | OUTPATIENT
Start: 2024-12-18 | End: 2024-12-19 | Stop reason: HOSPADM

## 2024-12-18 RX ADMIN — DEXTROSE MONOHYDRATE 1500 MG: 50 INJECTION, SOLUTION INTRAVENOUS at 14:58

## 2024-12-18 NOTE — CODE DOCUMENTATION
Patient refused for RN to take any vital signs stating that her vitals are never correct at a doctors office. Educated patient on the importance of regular vital sign checks and patient verbalized understanding and continued to refuse vitals at today's visit.

## 2024-12-18 NOTE — TELEPHONE ENCOUNTER
Caller: Shone, Gloria D    Relationship to patient: Self    Best call back number: [phone]     Patient is needing: PATIENT CALLED TO NOTIFY DR DAMIAN THAT THOUGH SHE HAS AN APPOINTMENT SET FOR 12.26.24 TO COME BY FOR BLOOD WORK, SHE ALREADY HAD EXTENSIVE LAB WORK DONE AT THE HOSPITAL    PATIENT ASKS THAT DR DAMIAN LOOK OVER THOSE LABS TO MAKE SURE THAT WE AREN'T DUPLICATING ANY LABS    PLEASE ADVISE

## 2025-01-02 ENCOUNTER — OFFICE VISIT (OUTPATIENT)
Dept: FAMILY MEDICINE CLINIC | Facility: CLINIC | Age: 75
End: 2025-01-02
Payer: MEDICARE

## 2025-01-02 VITALS
RESPIRATION RATE: 16 BRPM | SYSTOLIC BLOOD PRESSURE: 122 MMHG | HEIGHT: 66 IN | BODY MASS INDEX: 43.36 KG/M2 | TEMPERATURE: 97.5 F | WEIGHT: 269.8 LBS | HEART RATE: 78 BPM | OXYGEN SATURATION: 95 % | DIASTOLIC BLOOD PRESSURE: 80 MMHG

## 2025-01-02 DIAGNOSIS — E03.9 ACQUIRED HYPOTHYROIDISM: ICD-10-CM

## 2025-01-02 DIAGNOSIS — E55.9 VITAMIN D DEFICIENCY: ICD-10-CM

## 2025-01-02 DIAGNOSIS — I10 PRIMARY HYPERTENSION: Primary | ICD-10-CM

## 2025-01-02 DIAGNOSIS — B00.9 HSV-1 INFECTION: ICD-10-CM

## 2025-01-02 DIAGNOSIS — E66.813 CLASS 3 SEVERE OBESITY DUE TO EXCESS CALORIES WITH SERIOUS COMORBIDITY AND BODY MASS INDEX (BMI) OF 40.0 TO 44.9 IN ADULT: ICD-10-CM

## 2025-01-02 DIAGNOSIS — E78.2 MIXED HYPERLIPIDEMIA: ICD-10-CM

## 2025-01-02 DIAGNOSIS — K52.9 CHRONIC DIARRHEA: ICD-10-CM

## 2025-01-02 DIAGNOSIS — E66.01 CLASS 3 SEVERE OBESITY DUE TO EXCESS CALORIES WITH SERIOUS COMORBIDITY AND BODY MASS INDEX (BMI) OF 40.0 TO 44.9 IN ADULT: ICD-10-CM

## 2025-01-02 PROCEDURE — 99215 OFFICE O/P EST HI 40 MIN: CPT | Performed by: FAMILY MEDICINE

## 2025-01-02 PROCEDURE — G2211 COMPLEX E/M VISIT ADD ON: HCPCS | Performed by: FAMILY MEDICINE

## 2025-01-02 PROCEDURE — 3074F SYST BP LT 130 MM HG: CPT | Performed by: FAMILY MEDICINE

## 2025-01-02 PROCEDURE — 3079F DIAST BP 80-89 MM HG: CPT | Performed by: FAMILY MEDICINE

## 2025-01-02 RX ORDER — GENTAMICIN SULFATE 1 MG/G
1 CREAM TOPICAL DAILY
COMMUNITY
Start: 2024-12-19

## 2025-01-02 RX ORDER — AMLODIPINE BESYLATE 5 MG/1
5 TABLET ORAL DAILY
Qty: 90 TABLET | Refills: 3 | Status: SHIPPED | OUTPATIENT
Start: 2025-01-02

## 2025-01-02 RX ORDER — ACYCLOVIR 400 MG/1
400 TABLET ORAL
COMMUNITY
End: 2025-01-02 | Stop reason: SDUPTHER

## 2025-01-02 RX ORDER — LEVOTHYROXINE SODIUM 50 UG/1
50 TABLET ORAL
Qty: 90 TABLET | Refills: 3 | Status: SHIPPED | OUTPATIENT
Start: 2025-01-02

## 2025-01-02 RX ORDER — LOSARTAN POTASSIUM 100 MG/1
100 TABLET ORAL DAILY
Qty: 90 TABLET | Refills: 3 | Status: SHIPPED | OUTPATIENT
Start: 2025-01-02

## 2025-01-02 RX ORDER — ACYCLOVIR 400 MG/1
400 TABLET ORAL 2 TIMES DAILY
Qty: 180 TABLET | Refills: 3 | Status: SHIPPED | OUTPATIENT
Start: 2025-01-02

## 2025-01-02 NOTE — PROGRESS NOTES
"    Office Note     Name: Gloria D Shone  : 1950   MRN: [id number]     Chief Complaint:  Hospital Follow Up Visit (Left foot wound infection )    Subjective     History of Present Illness:  Gloria D Shone is a 74 y.o. female who presents today for hyperlipidemia and transaminitis follow-up.    Patient has history of mixed hyperlipidemia, but mostly hypertriglyceridemia.  Last lipid panel 2024.  Patient was going to have blood work done prior to this appointment but did not have the blood work done.  She also has history of transaminitis with elevation of alk phos.  However patient has had CMP performed during recent hospitalization and alkaline phosphatase is back within normal limits.  See was also performed during last hospitalization 2024 and was 6.0.    To have history of hypothyroidism with last thyroid labs within normal limits.  Currently on levothyroxine 50 mcg daily.      Patient also has history of hypertension controlled with losartan 100 mg daily, amlodipine 5 mg daily, and hydralazine 10 mg twice daily.    She needs 90-day supply of medications    I have reviewed the following portions of the patient's history and these were updated and discussed with the patient as appropriate: past family history, past medical history, past social history, past surgical history, and problem list.     Objective     Vital Signs  /80   Pulse 78   Temp 97.5 °F (36.4 °C) (Temporal)   Resp 16   Ht 166.4 cm (65.5\")   Wt 122 kg (269 lb 12.8 oz)   SpO2 95%   BMI 44.21 kg/m²   Estimated body mass index is 44.21 kg/m² as calculated from the following:    Height as of this encounter: 166.4 cm (65.5\").    Weight as of this encounter: 122 kg (269 lb 12.8 oz).    Physical Exam  Vitals reviewed.   Constitutional:       General: She is not in acute distress.     Appearance: Normal appearance. She is obese. She is not ill-appearing.   HENT:      Head: Normocephalic.   Eyes:      Extraocular Movements: " Extraocular movements intact.   Cardiovascular:      Rate and Rhythm: Normal rate.   Pulmonary:      Effort: Pulmonary effort is normal.      Breath sounds: Normal breath sounds.   Neurological:      Mental Status: She is alert and oriented to person, place, and time.   Psychiatric:         Mood and Affect: Mood normal.         Behavior: Behavior normal.               Assessment and Plan     Diagnoses and all orders for this visit:    1. Primary hypertension (Primary)  -     amLODIPine (NORVASC) 5 MG tablet; Take 1 tablet by mouth Daily.  Dispense: 90 tablet; Refill: 3  -     losartan (COZAAR) 100 MG tablet; Take 1 tablet by mouth Daily.  Dispense: 90 tablet; Refill: 3    2. Mixed hyperlipidemia    3. Class 3 severe obesity due to excess calories with serious comorbidity and body mass index (BMI) of 40.0 to 44.9 in adult    4. Acquired hypothyroidism  -     levothyroxine (SYNTHROID, LEVOTHROID) 50 MCG tablet; Take 1 tablet by mouth Every Morning. On an empty stomach  Dispense: 90 tablet; Refill: 3    5. Vitamin D deficiency    6. HSV-1 infection  -     acyclovir (ZOVIRAX) 400 MG tablet; Take 1 tablet by mouth 2 (Two) Times a Day. Take no more than 5 doses a day.  Dispense: 180 tablet; Refill: 3    7. Chronic diarrhea    Hypertension well-controlled on amlodipine and losartan.  Will continue these medicines 90-day supply sent to the pharmacy  Hypothyroidism well-controlled on levothyroxine 50 mcg daily.  Will send 90-day supply to pharmacy  Patient on suppression dose of acyclovir for HSV 1.  Will refill 90-day supply to pharmacy on diarrhea has improved since using loperamide.  Patient has a couple of bottles still left of this.  She also attributes some improvement to using her squatty potty.  Encouraged patient to follow-up with gastro appointment.      Discussion Summary:     Discussed plan of care in detail with patient today. Patient was encouraged to keep me informed of any acute changes, lack of improvement,  or any new concerning symptoms.  Patient is also aware of reasons to seek emergent care. Patient verbalized understanding and agrees with plan of care.    This visit was billed based on time.  I spent  53  minutes caring for Gloria D Shone on this date of service. This time includes time spent by me in the following activities:preparing for the visit, reviewing tests, obtaining and/or reviewing a separately obtained history, performing a medically appropriate examination and/or evaluation , counseling and educating the patient/family/caregiver, ordering medications, tests, or procedures, referring and communicating with other health care professionals , documenting information in the medical record, independently interpreting results and communicating that information with the patient/family/caregiver, and care coordination.    Follow Up  Return in about 2 months (around 3/10/2025) for Medicare Wellness, recheck a1c.    Please note that portions of this note may have been completed with a voice recognition program.     Mohsen Colunga MD, MPH  [facility] [place]

## 2025-01-16 ENCOUNTER — OFFICE VISIT (OUTPATIENT)
Dept: GASTROENTEROLOGY | Facility: CLINIC | Age: 75
End: 2025-01-16
Payer: MEDICARE

## 2025-01-16 VITALS — DIASTOLIC BLOOD PRESSURE: 80 MMHG | HEART RATE: 88 BPM | OXYGEN SATURATION: 98 % | SYSTOLIC BLOOD PRESSURE: 122 MMHG

## 2025-01-16 DIAGNOSIS — R19.7 DIARRHEA, UNSPECIFIED TYPE: Primary | Chronic | ICD-10-CM

## 2025-01-16 DIAGNOSIS — E66.01 CLASS 3 SEVERE OBESITY DUE TO EXCESS CALORIES WITH SERIOUS COMORBIDITY AND BODY MASS INDEX (BMI) OF 40.0 TO 44.9 IN ADULT: Chronic | ICD-10-CM

## 2025-01-16 DIAGNOSIS — K92.9 FUNCTIONAL GASTROINTESTINAL DISORDER: Chronic | ICD-10-CM

## 2025-01-16 DIAGNOSIS — E66.813 CLASS 3 SEVERE OBESITY DUE TO EXCESS CALORIES WITH SERIOUS COMORBIDITY AND BODY MASS INDEX (BMI) OF 40.0 TO 44.9 IN ADULT: Chronic | ICD-10-CM

## 2025-01-16 RX ORDER — COLESTIPOL HYDROCHLORIDE 1 G/1
1 TABLET ORAL 2 TIMES DAILY
Qty: 60 TABLET | Refills: 5 | Status: SHIPPED | OUTPATIENT
Start: 2025-01-16

## 2025-01-16 NOTE — PATIENT INSTRUCTIONS
High fiber, low fat diet with liberal water intake.   Metamucil 1 packet/scoop daily or gummies/capsules 2-4 per day.   Low FODMAP diet - avoid all dairy. May use lactose free/dairy free alternatives such as almond milk, rice milk, oat milk, etc.   Stool studies  Colestid 1 gm 1 po twice a day. Decrease to once a day if having constipation.  Advised to take other medications at least 1 hour prior to taking Colestid or 4 hours after taking Colestid.   May take Imodium as needed for breakthrough diarrhea.   Follow up: 6 months      Low-FODMAP Eating Plan    FODMAP stands for fermentable oligosaccharides, disaccharides, monosaccharides, and polyols. These are sugars that are hard for some people to digest. A low-FODMAP eating plan may help some people who have irritable bowel syndrome (IBS) and certain other bowel (intestinal) diseases to manage their symptoms.  This meal plan can be complicated to follow. Work with a diet and nutrition specialist (dietitian) to make a low-FODMAP eating plan that is right for you. A dietitian can help make sure that you get enough nutrition from this diet.  What are tips for following this plan?  Reading food labels  Check labels for hidden FODMAPs such as:  High-fructose syrup.  Honey.  Agave.  Natural fruit flavors.  Onion or garlic powder.  Choose low-FODMAP foods that contain 3-4 grams of fiber per serving.  Check food labels for serving sizes. Eat only one serving at a time to make sure FODMAP levels stay low.  Shopping  Shop with a list of foods that are recommended on this diet and make a meal plan.  Meal planning  Follow a low-FODMAP eating plan for up to 6 weeks, or as told by your health care provider or dietitian.  To follow the eating plan:  Eliminate high-FODMAP foods from your diet completely. Choose only low-FODMAP foods to eat. You will do this for 2-6 weeks.  Gradually reintroduce high-FODMAP foods into your diet one at a time. Most people should wait a few days before  introducing the next new high-FODMAP food into their meal plan. Your dietitian can recommend how quickly you may reintroduce foods.  Keep a daily record of what and how much you eat and drink. Make note of any symptoms that you have after eating.  Review your daily record with a dietitian regularly to identify which foods you can eat and which foods you should avoid.  General tips  Drink enough fluid each day to keep your urine pale yellow.  Avoid processed foods. These often have added sugar and may be high in FODMAPs.  Avoid most dairy products, whole grains, and sweeteners.  Work with a dietitian to make sure you get enough fiber in your diet.  Avoid high FODMAP foods at meals to manage symptoms.    Recommended foods  Fruits  Bananas, oranges, tangerines, diane, limes, blueberries, raspberries, strawberries, grapes, cantaloupe, honeydew melon, kiwi, papaya, passion fruit, and pineapple. Limited amounts of dried cranberries, banana chips, and shredded coconut.  Vegetables  Eggplant, zucchini, cucumber, peppers, green beans, bean sprouts, lettuce, arugula, kale, Swiss chard, spinach, nayely greens, bok paco, summer squash, potato, and tomato. Limited amounts of corn, carrot, and sweet potato. Green parts of scallions.  Grains  Gluten-free grains, such as rice, oats, buckwheat, quinoa, corn, polenta, and millet. Gluten-free pasta, bread, or cereal. Rice noodles. Corn tortillas.  Meats and other proteins  Unseasoned beef, pork, poultry, or fish. Eggs. Kerns. Tofu (firm) and tempeh. Limited amounts of nuts and seeds, such as almonds, walnuts, brazil nuts, pecans, peanuts, nut butters, pumpkin seeds, daniel seeds, and sunflower seeds.  Dairy  Lactose-free milk, yogurt, and kefir. Lactose-free cottage cheese and ice cream. Non-dairy milks, such as almond, coconut, hemp, and rice milk. Non-dairy yogurt. Limited amounts of goat cheese, brie, mozzarella, parmesan, swiss, and other hard cheeses.  Fats and oils  Butter-free  "spreads. Vegetable oils, such as olive, canola, and sunflower oil.  Seasoning and other foods  Artificial sweeteners with names that do not end in \"ol,\" such as aspartame, saccharine, and stevia. Maple syrup, white table sugar, raw sugar, brown sugar, and molasses. Mayonnaise, soy sauce, and tamari. Fresh basil, coriander, parsley, rosemary, and thyme.  Beverages  Water and mineral water. Sugar-sweetened soft drinks. Small amounts of orange juice or cranberry juice. Black and green tea. Most dry deonte. Coffee.  The items listed above may not be a complete list of foods and beverages you can eat. Contact a dietitian for more information.    Foods to avoid  Fruits  Fresh, dried, and juiced forms of apple, pear, watermelon, peach, plum, cherries, apricots, blackberries, boysenberries, figs, nectarines, and nahomy. Avocado.  Vegetables  Chicory root, artichoke, asparagus, cabbage, snow peas, [place] sprouts, broccoli, sugar snap peas, mushrooms, celery, and cauliflower. Onions, garlic, leeks, and the white part of scallions.  Grains  Wheat, including kamut, durum, and semolina. Barley and bulgur. Couscous. Wheat-based cereals. Wheat noodles, bread, crackers, and pastries.  Meats and other proteins  Fried or fatty meat. Sausage. Cashews and pistachios. Soybeans, baked beans, black beans, chickpeas, kidney beans, lucas beans, navy beans, lentils, black-eyed peas, and split peas.  Dairy  Milk, yogurt, ice cream, and soft cheese. Cream and sour cream. Milk-based sauces. Custard. Buttermilk. Soy milk.  Seasoning and other foods  Any sugar-free gum or candy. Foods that contain artificial sweeteners such as sorbitol, mannitol, isomalt, or xylitol. Foods that contain honey, high-fructose corn syrup, or agave. Bouillon, vegetable stock, beef stock, and chicken stock. Garlic and onion powder. Condiments made with onion, such as hummus, chutney, pickles, relish, salad dressing, and salsa. Tomato paste.  Beverages  Chicory-based " drinks. Coffee substitutes. Chamomile tea. Fennel tea. Sweet or fortified deonte such as port or joe. Diet soft drinks made with isomalt, mannitol, maltitol, sorbitol, or xylitol. Apple, pear, and nahomy juice. Juices with high-fructose corn syrup.  The items listed above may not be a complete list of foods and beverages you should avoid. Contact a dietitian for more information.    Summary  FODMAP stands for fermentable oligosaccharides, disaccharides, monosaccharides, and polyols. These are sugars that are hard for some people to digest.  A low-FODMAP eating plan is a short-term diet that helps to ease symptoms of certain bowel diseases.  The eating plan usually lasts up to 6 weeks. After that, high-FODMAP foods are reintroduced gradually and one at a time. This can help you find out which foods may be causing symptoms.  A low-FODMAP eating plan can be complicated. It is best to work with a dietitian who has experience with this type of plan.  This information is not intended to replace advice given to you by your health care provider. Make sure you discuss any questions you have with your health care provider.  Document Revised: 05/06/2021 Document Reviewed: 05/06/2021  Elsevier Patient Education © 2023 [employer]

## 2025-01-20 ENCOUNTER — TELEPHONE (OUTPATIENT)
Dept: FAMILY MEDICINE CLINIC | Facility: CLINIC | Age: 75
End: 2025-01-20
Payer: MEDICARE

## 2025-01-20 NOTE — TELEPHONE ENCOUNTER
PATIENT CALLED AND STATED SHE THINKS SHE HAS A UTI AND NEEDS TO START MEDICATION TODAY FOR IT, HOWEVER SHE CAN'T GET OUT OF HER DRIVEWAY FOR AN APPT, OR EVEN TO GO GET ANY MEDS ANYWAYS. SHE DECLINED MYCHART VISIT. SHE BASICALLY WANTED TO LET US KNOW THAT SHE IS GOING TO TAKE SOME CEPHALEXIN 500 THAT WAS GIVEN TO HER PREVIOUSLY BY [facility]. FOR SOME REASON SHE HAD STARTED TO TAKE THEM FOR A UTI, BUT THEY ENDED UP CHANGING THEM TO A DIFFERENT MEDICATION. SHE HAS 20 PILLS STILL LEFT.

## 2025-01-21 ENCOUNTER — TELEPHONE (OUTPATIENT)
Dept: FAMILY MEDICINE CLINIC | Facility: CLINIC | Age: 75
End: 2025-01-21
Payer: MEDICARE

## 2025-01-22 ENCOUNTER — PATIENT MESSAGE (OUTPATIENT)
Dept: GASTROENTEROLOGY | Facility: CLINIC | Age: 75
End: 2025-01-22
Payer: MEDICARE

## 2025-01-29 ENCOUNTER — OFFICE VISIT (OUTPATIENT)
Dept: FAMILY MEDICINE CLINIC | Facility: CLINIC | Age: 75
End: 2025-01-29
Payer: MEDICARE

## 2025-01-29 ENCOUNTER — REFERRAL TRIAGE (OUTPATIENT)
Age: 75
End: 2025-01-29
Payer: MEDICARE

## 2025-01-29 VITALS
HEIGHT: 66 IN | HEART RATE: 82 BPM | WEIGHT: 269 LBS | TEMPERATURE: 97.9 F | OXYGEN SATURATION: 98 % | SYSTOLIC BLOOD PRESSURE: 126 MMHG | RESPIRATION RATE: 20 BRPM | DIASTOLIC BLOOD PRESSURE: 84 MMHG | BODY MASS INDEX: 43.23 KG/M2

## 2025-01-29 DIAGNOSIS — Z59.82 TRANSPORTATION INSECURITY: ICD-10-CM

## 2025-01-29 DIAGNOSIS — J10.1 INFLUENZA A: Primary | ICD-10-CM

## 2025-01-29 DIAGNOSIS — R06.02 SOB (SHORTNESS OF BREATH): ICD-10-CM

## 2025-01-29 DIAGNOSIS — R05.1 ACUTE COUGH: ICD-10-CM

## 2025-01-29 LAB
EXPIRATION DATE: ABNORMAL
FLUAV AG UPPER RESP QL IA.RAPID: DETECTED
FLUBV AG UPPER RESP QL IA.RAPID: NOT DETECTED
INTERNAL CONTROL: ABNORMAL
Lab: ABNORMAL
SARS-COV-2 AG UPPER RESP QL IA.RAPID: NOT DETECTED

## 2025-01-29 PROCEDURE — 3074F SYST BP LT 130 MM HG: CPT | Performed by: FAMILY MEDICINE

## 2025-01-29 PROCEDURE — 1160F RVW MEDS BY RX/DR IN RCRD: CPT | Performed by: FAMILY MEDICINE

## 2025-01-29 PROCEDURE — 3079F DIAST BP 80-89 MM HG: CPT | Performed by: FAMILY MEDICINE

## 2025-01-29 PROCEDURE — 99215 OFFICE O/P EST HI 40 MIN: CPT | Performed by: FAMILY MEDICINE

## 2025-01-29 PROCEDURE — 1159F MED LIST DOCD IN RCRD: CPT | Performed by: FAMILY MEDICINE

## 2025-01-29 PROCEDURE — 87428 SARSCOV & INF VIR A&B AG IA: CPT | Performed by: FAMILY MEDICINE

## 2025-01-29 RX ORDER — OSELTAMIVIR PHOSPHATE 75 MG/1
75 CAPSULE ORAL 2 TIMES DAILY
Qty: 10 CAPSULE | Refills: 0 | Status: SHIPPED | OUTPATIENT
Start: 2025-01-29 | End: 2025-02-03

## 2025-01-29 SDOH — ECONOMIC STABILITY - TRANSPORTATION SECURITY: TRANSPORTATION INSECURITY: Z59.82

## 2025-01-29 NOTE — PROGRESS NOTES
"    Office Note     Name: Gloria D Shone  : 1950   MRN: [id number]     Chief Complaint:  Cough (Pt has had a cough since yesterday. Not having any other symptoms )    Subjective     History of Present Illness:  Gloria D Shone is a 74 y.o. female who presents today for acute cough. Denies SOB, wheeze, hemoptysis, N/V/D, headaches, fever, or chills. She does have some nasal congestion noted. No known sick contacts. Was recently on abx for UTI and continues on Bactrim for foot infection.    I have reviewed the following portions of the patient's history and these were updated and discussed with the patient as appropriate: past family history, past medical history, past social history, past surgical history, and problem list.     Objective     Vital Signs  /84   Pulse 82   Temp 97.9 °F (36.6 °C) (Temporal)   Resp 20   Ht 166.4 cm (65.5\")   Wt 122 kg (269 lb)   SpO2 98%   BMI 44.08 kg/m²   Estimated body mass index is 44.08 kg/m² as calculated from the following:    Height as of this encounter: 166.4 cm (65.5\").    Weight as of this encounter: 122 kg (269 lb).    Physical Exam  Vitals reviewed.   Constitutional:       General: She is not in acute distress.     Appearance: Normal appearance. She is not ill-appearing or toxic-appearing.   HENT:      Head: Normocephalic.      Right Ear: Tympanic membrane, ear canal and external ear normal.      Left Ear: Tympanic membrane, ear canal and external ear normal.      Nose: Congestion present.      Mouth/Throat:      Mouth: Mucous membranes are moist.      Pharynx: Posterior oropharyngeal erythema present. No oropharyngeal exudate.      Comments: PND noted  Eyes:      Extraocular Movements: Extraocular movements intact.   Cardiovascular:      Rate and Rhythm: Normal rate and regular rhythm.      Heart sounds: Normal heart sounds. No murmur heard.  Pulmonary:      Effort: Pulmonary effort is normal. No respiratory distress.      Breath sounds: No stridor. " Rhonchi present. No wheezing or rales.   Chest:      Chest wall: No tenderness.   Musculoskeletal:         General: Normal range of motion.      Cervical back: Normal range of motion and neck supple. No rigidity or tenderness.   Lymphadenopathy:      Cervical: Cervical adenopathy present.   Skin:     General: Skin is warm and dry.   Neurological:      Mental Status: She is alert and oriented to person, place, and time.   Psychiatric:         Mood and Affect: Mood normal.               Assessment and Plan     Diagnoses and all orders for this visit:    1. Influenza A (Primary)  -     POCT SARS-CoV-2 Antigen SANNA + Flu  -     oseltamivir (Tamiflu) 75 MG capsule; Take 1 capsule by mouth 2 (Two) Times a Day for 5 days.  Dispense: 10 capsule; Refill: 0    2. Acute cough  -     XR Chest PA & Lateral (In Office)  -     POCT SARS-CoV-2 Antigen SANNA + Flu    3. SOB (shortness of breath)  -     XR Chest PA & Lateral (In Office)  -     POCT SARS-CoV-2 Antigen SANNA + Flu    4. Transportation insecurity  -     Ambulatory Referral to Case Management    POC SARS-CoV-2/FLU test is POSITIVE for influenza A  CXR ordered today. Per my interpretation bronchitis noted which is confirmed by radiologist official read  Patient's vital signs demonstrate hemodynamic stability  Discussed treatment options. Patient is within treatment window.  Reviewed medication list.  Will start Tamiflu today, e-Rx sent to pharmacy.  Discussion regarding course of influenza virus  Symptomatic treatment   Increase fluid intake  Patient is having some transportation issues. Referral to [facility] .         Discussion Summary:     Discussed plan of care in detail with patient today. Patient was encouraged to keep me informed of any acute changes, lack of improvement, or any new concerning symptoms.  Patient is also aware of reasons to seek emergent care. Patient verbalized understanding and agrees with plan of care.    This visit was billed based on time.  I  spent 40 minutes caring for Gloria D Shone on this date of service. This time includes time spent by me in the following activities:preparing for the visit, reviewing tests, obtaining and/or reviewing a separately obtained history, performing a medically appropriate examination and/or evaluation , counseling and educating the patient/family/caregiver, ordering medications, tests, or procedures, referring and communicating with other health care professionals , documenting information in the medical record, independently interpreting results and communicating that information with the patient/family/caregiver, and care coordination.    Follow Up  Return for Next scheduled follow up.    Please note that portions of this note may have been completed with a voice recognition program.     Mohsen Colunga MD, MPH  [facility] [place]

## 2025-01-30 ENCOUNTER — TELEPHONE (OUTPATIENT)
Dept: FAMILY MEDICINE CLINIC | Facility: CLINIC | Age: 75
End: 2025-01-30
Payer: MEDICARE

## 2025-01-30 NOTE — TELEPHONE ENCOUNTER
Pt called concerned about medication sent in yesterday for the flu. She states she had an episode of diarrhea this morning and wants to know if it is from the tamiflu and if she should take medication GI physician prescribed for diarrhea. Dr Colunga advises to have pt call her GI doctor if diarrhea continues and medication they prescribed does not help.  Called pt, gave her advise, she states she thinks the medication for flu and diarrhea are working now and will call us if she needs anything.

## 2025-01-31 ENCOUNTER — PATIENT OUTREACH (OUTPATIENT)
Age: 75
End: 2025-01-31
Payer: MEDICARE

## 2025-01-31 NOTE — OUTREACH NOTE
SW attempted contact with UTRx1. SW will attempt again in a couple of business days.     Diamond MOISE -   Ambulatory Case Management    1/31/2025, 12:23 EST

## 2025-02-04 ENCOUNTER — PATIENT OUTREACH (OUTPATIENT)
Age: 75
End: 2025-02-04
Payer: MEDICARE

## 2025-02-04 NOTE — OUTREACH NOTE
SW attempted contact with UTRx1. SW will attempt again in a couple of business days.     Diamond MOISE -   Ambulatory Case Management    2/4/2025, 09:56 EST

## 2025-02-11 ENCOUNTER — PATIENT OUTREACH (OUTPATIENT)
Age: 75
End: 2025-02-11
Payer: MEDICARE

## 2025-02-11 NOTE — OUTREACH NOTE
UTRx3. SW has attempted to contact pt with UTRx3. SW will D/c pt due to UTR, if no response within one week. Pt may contact SW and received services, should they be needed in the future.     Diamond MOISE -   Ambulatory Case Management    2/11/2025, 09:28 EST

## 2025-02-18 ENCOUNTER — PATIENT OUTREACH (OUTPATIENT)
Age: 75
End: 2025-02-18
Payer: MEDICARE

## 2025-02-18 NOTE — OUTREACH NOTE
SW to D/c pt due to UTR.     Diamond MOISE -   Ambulatory Case Management    2/18/2025, 15:26 EST

## 2025-03-03 ENCOUNTER — OFFICE VISIT (OUTPATIENT)
Dept: FAMILY MEDICINE CLINIC | Facility: CLINIC | Age: 75
End: 2025-03-03
Payer: MEDICARE

## 2025-03-03 VITALS
TEMPERATURE: 98.2 F | DIASTOLIC BLOOD PRESSURE: 86 MMHG | HEART RATE: 98 BPM | RESPIRATION RATE: 16 BRPM | HEIGHT: 66 IN | WEIGHT: 272.8 LBS | OXYGEN SATURATION: 95 % | SYSTOLIC BLOOD PRESSURE: 128 MMHG | BODY MASS INDEX: 43.84 KG/M2

## 2025-03-03 DIAGNOSIS — R35.0 URINARY FREQUENCY: Primary | ICD-10-CM

## 2025-03-03 DIAGNOSIS — R82.90 FOUL SMELLING URINE: ICD-10-CM

## 2025-03-03 DIAGNOSIS — R50.9 FEVER, UNSPECIFIED FEVER CAUSE: ICD-10-CM

## 2025-03-03 DIAGNOSIS — N30.01 ACUTE CYSTITIS WITH HEMATURIA: ICD-10-CM

## 2025-03-03 LAB
BILIRUB BLD-MCNC: NEGATIVE MG/DL
CLARITY, POC: ABNORMAL
COLOR UR: YELLOW
EXPIRATION DATE: ABNORMAL
EXPIRATION DATE: NORMAL
FLUAV AG UPPER RESP QL IA.RAPID: NOT DETECTED
FLUBV AG UPPER RESP QL IA.RAPID: NOT DETECTED
GLUCOSE UR STRIP-MCNC: NEGATIVE MG/DL
INTERNAL CONTROL: NORMAL
KETONES UR QL: NEGATIVE
LEUKOCYTE EST, POC: ABNORMAL
Lab: ABNORMAL
Lab: NORMAL
NITRITE UR-MCNC: NEGATIVE MG/ML
PH UR: 6 [PH] (ref 5–8)
PROT UR STRIP-MCNC: ABNORMAL MG/DL
RBC # UR STRIP: ABNORMAL /UL
SARS-COV-2 AG UPPER RESP QL IA.RAPID: NOT DETECTED
SP GR UR: 1.02 (ref 1–1.03)
UROBILINOGEN UR QL: ABNORMAL

## 2025-03-03 RX ORDER — NITROFURANTOIN 25; 75 MG/1; MG/1
100 CAPSULE ORAL 2 TIMES DAILY
Qty: 10 CAPSULE | Refills: 0 | Status: SHIPPED | OUTPATIENT
Start: 2025-03-03 | End: 2025-03-08

## 2025-03-03 NOTE — PROGRESS NOTES
"    Office Note     Name: Gloria D Shone  : 1950   MRN: [id number]     Chief Complaint:  Cystitis (Pt has had frequent urination and occasional incontinence. )    Subjective     History of Present Illness:  Gloria D Shone is a 74 y.o. female who presents today for urinary frequency and urgency.  Symptoms started a few days ago.  Patient states that this is similar to UTIs she has had in the past.  No hematuria or flank pain.    I have reviewed the following portions of the patient's history and these were updated and discussed with the patient as appropriate: past family history, past medical history, past social history, past surgical history, and problem list.     Objective     Vital Signs  /86   Pulse 98   Temp 98.2 °F (36.8 °C) (Oral)   Resp 16   Ht 166.4 cm (65.5\")   Wt 124 kg (272 lb 12.8 oz)   SpO2 95%   BMI 44.71 kg/m²   Estimated body mass index is 44.71 kg/m² as calculated from the following:    Height as of this encounter: 166.4 cm (65.5\").    Weight as of this encounter: 124 kg (272 lb 12.8 oz).    Physical Exam  Vitals reviewed.   Constitutional:       General: She is not in acute distress.     Appearance: Normal appearance. She is not ill-appearing.   HENT:      Head: Normocephalic.   Eyes:      Extraocular Movements: Extraocular movements intact.   Cardiovascular:      Rate and Rhythm: Normal rate.   Pulmonary:      Effort: Pulmonary effort is normal.      Breath sounds: Normal breath sounds.   Neurological:      Mental Status: She is alert and oriented to person, place, and time.   Psychiatric:         Mood and Affect: Mood normal.         Behavior: Behavior normal.               Assessment and Plan     Diagnoses and all orders for this visit:    1. Urinary frequency (Primary)  -     Urinalysis With Microscopic - Urine, Clean Catch  -     Urine Culture - Urine, Urine, Clean Catch  -     POCT urinalysis dipstick, automated    2. Foul smelling urine  -     Urinalysis With " Microscopic - Urine, Clean Catch  -     Urine Culture - Urine, Urine, Clean Catch    3. Acute cystitis with hematuria  -     nitrofurantoin, macrocrystal-monohydrate, (Macrobid) 100 MG capsule; Take 1 capsule by mouth 2 (Two) Times a Day for 5 days.  Dispense: 10 capsule; Refill: 0    4. Fever, unspecified fever cause  -     POCT SARS-CoV-2 Antigen SANNA + Flu         Discussion Summary:     Discussed plan of care in detail with patient today. Patient was encouraged to keep me informed of any acute changes, lack of improvement, or any new concerning symptoms.  Patient is also aware of reasons to seek emergent care. Patient verbalized understanding and agrees with plan of care.    This visit was billed based on time.  I spent 40 minutes caring for Gloria D Shone on this date of service. This time includes time spent by me in the following activities:preparing for the visit, reviewing tests, obtaining and/or reviewing a separately obtained history, performing a medically appropriate examination and/or evaluation , counseling and educating the patient/family/caregiver, ordering medications, tests, or procedures, referring and communicating with other health care professionals , documenting information in the medical record, independently interpreting results and communicating that information with the patient/family/caregiver, and care coordination.    Follow Up  Return for Next scheduled follow up.    Please note that portions of this note may have been completed with a voice recognition program.     Mohsen Colunga MD, MPH  [facility] [place]

## 2025-03-06 ENCOUNTER — TELEPHONE (OUTPATIENT)
Dept: FAMILY MEDICINE CLINIC | Facility: CLINIC | Age: 75
End: 2025-03-06

## 2025-03-06 ENCOUNTER — OFFICE VISIT (OUTPATIENT)
Dept: FAMILY MEDICINE CLINIC | Facility: CLINIC | Age: 75
End: 2025-03-06
Payer: MEDICARE

## 2025-03-06 VITALS
DIASTOLIC BLOOD PRESSURE: 82 MMHG | TEMPERATURE: 97.9 F | OXYGEN SATURATION: 97 % | HEART RATE: 85 BPM | HEIGHT: 66 IN | SYSTOLIC BLOOD PRESSURE: 128 MMHG | WEIGHT: 274 LBS | BODY MASS INDEX: 44.03 KG/M2 | RESPIRATION RATE: 16 BRPM

## 2025-03-06 DIAGNOSIS — L03.115 CELLULITIS OF RIGHT LOWER EXTREMITY: Primary | ICD-10-CM

## 2025-03-06 DIAGNOSIS — I87.2 CHRONIC VENOUS INSUFFICIENCY: ICD-10-CM

## 2025-03-06 DIAGNOSIS — R60.0 BILATERAL LOWER EXTREMITY EDEMA: ICD-10-CM

## 2025-03-06 RX ORDER — CEPHALEXIN 500 MG/1
500 CAPSULE ORAL 4 TIMES DAILY
Qty: 28 CAPSULE | Refills: 0 | Status: SHIPPED | OUTPATIENT
Start: 2025-03-06 | End: 2025-03-06

## 2025-03-06 RX ORDER — CEPHALEXIN 500 MG/1
500 CAPSULE ORAL 4 TIMES DAILY
Qty: 28 CAPSULE | Refills: 0 | Status: SHIPPED | OUTPATIENT
Start: 2025-03-06

## 2025-03-06 NOTE — TELEPHONE ENCOUNTER
Order has been faxed per patient request to [phone]. Patient has been notified that order has been faxed and she can call at her convenience to schedule.

## 2025-03-06 NOTE — TELEPHONE ENCOUNTER
Per voicemail from patient, she states that she discussed with you about scheduling an appointment with a current specialist for her cystitis ?. Patient called and stated that her wound care provider told her she needed to be scheduled with a Lymphedema clinic because she does not have an open wound. She wants the referral to be sent to the [facility] at [facility] (tel #[phone]). Let me know once you have referral entered and I will send to them for scheduling.

## 2025-03-10 ENCOUNTER — TELEPHONE (OUTPATIENT)
Dept: CARDIOLOGY | Facility: CLINIC | Age: 75
End: 2025-03-10
Payer: MEDICARE

## 2025-03-10 NOTE — TELEPHONE ENCOUNTER
Caller: Shone, Gloria D    Relationship: Self    Best call back number: [phone]    What is the best time to reach you: ANY    Who are you requesting to speak with (clinical staff, provider,  specific staff member): CLINICAL    What was the call regarding: PT'S PCP DR. DAMIAN IS GOING TO UPLOAD SOME EKG IMAGES SHE HAS DONE WITH HER APPLE WATCH, IT IS INDICATING AFIB AND SHE IS CONCERNED AND IS WONDERING IF DR. HURLEY WOULD BE ABLE TO REVIEW THEM AND SEE IF SHE NEEDS TO BE SEEN

## 2025-03-11 NOTE — TELEPHONE ENCOUNTER
Spoke with Pt and she does not have the tracings of the EKGs and states she has not had any symptoms. Please advise

## 2025-03-13 NOTE — PROGRESS NOTES
"    Office Note     Name: Gloria D Shone  : 1950   MRN: [id number]     Chief Complaint:  Leg Swelling (Pt is concerned her machine she uses at home for wound care is causing the leg swelling. Her legs are swollen and red. Right leg has large lump and legs are numb.)    Subjective     History of Present Illness:  Gloria D Shone is a 74 y.o. female who presents today for leg swelling and redness.  Right side greater than left side.  She had been using device to help with her lower extremity edema that she received from lymphedema clinic but stopped because of the redness.    I have reviewed the following portions of the patient's history and these were updated and discussed with the patient as appropriate: past family history, past medical history, past social history, past surgical history, and problem list.     Objective     Vital Signs  /82   Pulse 85   Temp 97.9 °F (36.6 °C) (Oral)   Resp 16   Ht 166.4 cm (65.5\")   Wt 124 kg (274 lb)   SpO2 97%   BMI 44.90 kg/m²   Estimated body mass index is 44.9 kg/m² as calculated from the following:    Height as of this encounter: 166.4 cm (65.5\").    Weight as of this encounter: 124 kg (274 lb).    Physical Exam  Vitals reviewed.   Constitutional:       General: She is not in acute distress.     Appearance: Normal appearance. She is not ill-appearing.   HENT:      Head: Normocephalic.   Eyes:      Extraocular Movements: Extraocular movements intact.   Cardiovascular:      Rate and Rhythm: Normal rate.   Pulmonary:      Effort: Pulmonary effort is normal.      Breath sounds: Normal breath sounds.   Musculoskeletal:      Right lower leg: Edema present.      Left lower leg: Edema present.   Skin:         Neurological:      Mental Status: She is alert and oriented to person, place, and time.   Psychiatric:         Mood and Affect: Mood normal.         Behavior: Behavior normal.               Assessment and Plan     Diagnoses and all orders for this " visit:    1. Cellulitis of right lower extremity (Primary)  -     cephalexin (KEFLEX) 500 MG capsule; Take 1 capsule by mouth 4 (Four) Times a Day.  Dispense: 28 capsule; Refill: 0    2. Bilateral lower extremity edema  -     Ambulatory Referral to Lymphedema Clinic    3. Chronic venous insufficiency  -     Ambulatory Referral to Lymphedema Clinic    Other orders  -     Discontinue: cephalexin (KEFLEX) 500 MG capsule; Take 1 capsule by mouth 4 (Four) Times a Day.  Dispense: 28 capsule; Refill: 0         Discussion Summary:     Discussed plan of care in detail with patient today. Patient was encouraged to keep me informed of any acute changes, lack of improvement, or any new concerning symptoms.  Patient is also aware of reasons to seek emergent care. Patient verbalized understanding and agrees with plan of care.    This visit was billed based on time.  I spent 30 minutes caring for Gloria D Shone on this date of service. This time includes time spent by me in the following activities:preparing for the visit, reviewing tests, obtaining and/or reviewing a separately obtained history, performing a medically appropriate examination and/or evaluation , counseling and educating the patient/family/caregiver, ordering medications, tests, or procedures, referring and communicating with other health care professionals , documenting information in the medical record, independently interpreting results and communicating that information with the patient/family/caregiver, and care coordination.    Follow Up  No follow-ups on file.    Please note that portions of this note may have been completed with a voice recognition program.     Mohsen Colunga MD, MPH  [facility] [place]

## 2025-04-10 ENCOUNTER — OFFICE VISIT (OUTPATIENT)
Dept: CARDIOLOGY | Facility: CLINIC | Age: 75
End: 2025-04-10
Payer: MEDICARE

## 2025-04-10 VITALS
DIASTOLIC BLOOD PRESSURE: 66 MMHG | HEART RATE: 93 BPM | WEIGHT: 274 LBS | BODY MASS INDEX: 45.65 KG/M2 | HEIGHT: 65 IN | SYSTOLIC BLOOD PRESSURE: 120 MMHG | OXYGEN SATURATION: 96 %

## 2025-04-10 DIAGNOSIS — I48.91 ATRIAL FIBRILLATION, UNSPECIFIED TYPE: Primary | ICD-10-CM

## 2025-04-10 DIAGNOSIS — I10 ESSENTIAL HYPERTENSION: ICD-10-CM

## 2025-04-10 DIAGNOSIS — E66.01 MORBID OBESITY WITH BMI OF 40.0-44.9, ADULT: ICD-10-CM

## 2025-04-10 PROCEDURE — 99213 OFFICE O/P EST LOW 20 MIN: CPT | Performed by: INTERNAL MEDICINE

## 2025-04-10 PROCEDURE — 3074F SYST BP LT 130 MM HG: CPT | Performed by: INTERNAL MEDICINE

## 2025-04-10 PROCEDURE — 3078F DIAST BP <80 MM HG: CPT | Performed by: INTERNAL MEDICINE

## 2025-04-10 RX ORDER — DICYCLOMINE HCL 20 MG
1 TABLET ORAL 3 TIMES DAILY PRN
COMMUNITY

## 2025-04-10 NOTE — PROGRESS NOTES
[facility] Cardiology Office Follow Up Note    Gloria D Shone  [id number]  04/10/2025    Primary Care Provider: Mohsen Colunga MD    Chief Complaint: Concern for atrial fibrillation    History of Present Illness:   Mrs. Gloria D Shone is a 74 y.o. female who presents to the Cardiology Clinic due to concern for atrial fibrillation.  The patient has a past medical history significant for hypertension, hyperlipidemia, and obesity with a BMI of 45.  She was initially evaluated in the cardiology clinic due to concern for atrial fibrillation.  An outpatient cardiac monitor in  showed no evidence of atrial fibrillation.  Today, the patient presents again for evaluation of potential atrial fibrillation.  The patient reports that her Apple Watch has recently reported episodes of atrial fibrillation.  The patient denies any episodes of palpitations.  No significant fatigue or exertional dyspnea.  She denies chest pain or exertional chest discomfort.  No other specific complaints today.    Past Cardiac Testin. Last Coronary Angio: None  2. Prior Stress Testing: None  3. Last Echo: 2024  1.  Normal left ventricular size and systolic function, LVEF 60-65%.  2.  Normal LV diastolic filling pattern.  3.  Normal right ventricular size and systolic function.  4.  Normal left atrial volume index.  5.  No significant valvular abnormalities.  4. Prior Holter Monitor: [facility] [place]               1.  No evidence of atrial fibrillation    Review of Systems:   Review of Systems   Constitutional:  Negative for activity change, appetite change, chills, diaphoresis, fatigue, fever, unexpected weight gain and unexpected weight loss.   Eyes:  Negative for blurred vision and double vision.   Respiratory:  Negative for cough, chest tightness, shortness of breath and wheezing.    Cardiovascular:  Negative for chest pain, palpitations and leg swelling.   Gastrointestinal:  Negative for abdominal pain, anal  bleeding, blood in stool and GERD.   Endocrine: Negative for cold intolerance and heat intolerance.   Genitourinary:  Negative for hematuria.   Neurological:  Negative for dizziness, syncope, weakness and light-headedness.   Hematological:  Does not bruise/bleed easily.   Psychiatric/Behavioral:  Negative for depressed mood and stress. The patient is not nervous/anxious.        I have reviewed and/or updated the patient's past medical, past surgical, family, social history, problem list and allergies as appropriate.     Medications:     Current Outpatient Medications:     acyclovir (ZOVIRAX) 400 MG tablet, Take 1 tablet by mouth 2 (Two) Times a Day. Take no more than 5 doses a day., Disp: 180 tablet, Rfl: 3    amLODIPine (NORVASC) 5 MG tablet, Take 1 tablet by mouth Daily., Disp: 90 tablet, Rfl: 3    CALCIUM PO, Take  by mouth., Disp: , Rfl:     Coenzyme Q10 (COQ-10 PO), Take  by mouth., Disp: , Rfl:     colestipol (COLESTID) 1 g tablet, Take 1 tablet by mouth 2 (Two) Times a Day. Decrease to once a day if having constipation., Disp: 60 tablet, Rfl: 5    dicyclomine (BENTYL) 20 MG tablet, Take 1 tablet by mouth 3 (Three) Times a Day As Needed., Disp: , Rfl:     gentamicin (GARAMYCIN) 0.1 % cream, Apply 1 Application topically to the appropriate area as directed Daily., Disp: , Rfl:     levothyroxine (SYNTHROID, LEVOTHROID) 50 MCG tablet, Take 1 tablet by mouth Every Morning. On an empty stomach, Disp: 90 tablet, Rfl: 3    loperamide (IMODIUM) 2 MG capsule, TAKE 1 CAPSULE BY MOUTH FOUR TIMES DAILY AS NEEDED FOR DIARRHEA, Disp: 120 capsule, Rfl: 0    losartan (COZAAR) 100 MG tablet, Take 1 tablet by mouth Daily., Disp: 90 tablet, Rfl: 3    MAGNESIUM ASPARTATE PO, Take  by mouth., Disp: , Rfl:     Multiple Vitamin (multivitamin) capsule, Take 1 capsule by mouth Daily., Disp: , Rfl:     NON FORMULARY, by Other route. Pt states she is taking D-LIMONENE FLAVOR, Disp: , Rfl:     Vitamin E 45 MG (100 UNIT) capsule, Take 1  "capsule by mouth Daily., Disp: , Rfl:     cephalexin (KEFLEX) 500 MG capsule, Take 1 capsule by mouth 4 (Four) Times a Day. (Patient not taking: Reported on 4/10/2025), Disp: 28 capsule, Rfl: 0    Physical Exam:  Vital Signs:   Vitals:    04/10/25 1406   BP: 120/66   BP Location: Left arm   Patient Position: Sitting   Cuff Size: Adult   Pulse: 93   SpO2: 96%   Weight: 124 kg (274 lb)   Height: 165.1 cm (65\")       Physical Exam  Constitutional:       General: She is not in acute distress.     Appearance: She is well-developed. She is obese. She is not diaphoretic.   HENT:      Head: Normocephalic and atraumatic.   Eyes:      General: No scleral icterus.     Pupils: Pupils are equal, round, and reactive to light.   Neck:      Trachea: No tracheal deviation.   Cardiovascular:      Rate and Rhythm: Normal rate and regular rhythm.      Heart sounds: Normal heart sounds. No murmur heard.     No friction rub. No gallop.      Comments: Normal JVD.    Pulmonary:      Effort: Pulmonary effort is normal. No respiratory distress.      Breath sounds: Normal breath sounds. No stridor. No wheezing or rales.   Chest:      Chest wall: No tenderness.   Abdominal:      General: Bowel sounds are normal. There is no distension.      Palpations: Abdomen is soft.      Tenderness: There is no abdominal tenderness. There is no guarding or rebound.   Musculoskeletal:         General: No swelling. Normal range of motion.      Cervical back: Neck supple. No tenderness.   Lymphadenopathy:      Cervical: No cervical adenopathy.   Skin:     General: Skin is warm and dry.      Findings: No erythema.   Neurological:      General: No focal deficit present.      Mental Status: She is alert and oriented to person, place, and time.   Psychiatric:         Mood and Affect: Mood normal.         Behavior: Behavior normal.         Results Review:   I reviewed the patient's new clinical results.      ECG 12 Lead    Date/Time: 4/11/2025 9:36 AM  Performed " by: Barrie Urbina MD    Authorized by: Barrie Urbina MD  Comparison: not compared with previous ECG   Previous ECG: no previous ECG available  Rhythm: sinus rhythm  Rate: normal  QRS axis: normal  Other findings: prolonged QTc interval    Clinical impression: abnormal EKG          Assessment / Plan:     1.  Concern for atrial fibrillation  -- Presents today due to concern for paroxysmal atrial fibrillation  --Apple Watch recently identified several episodes of potential atrial fibrillation, however no rhythm strips available for review  --No palpitations or symptoms to suggest symptomatic PAF  --Outpatient cardiac monitoring in 8/24 showed no evidence of atrial fibrillation  --ECG today continues to show sinus rhythm  --Discussed repeat outpatient cardiac monitoring, however given episodes are rare the patient plans to purchase a [employer] mobile device for home monitoring of atrial fibrillation  --If atrial fibrillation identified on home monitoring, the patient will call to schedule follow-up to review home monitoring strips  --Follow-up as needed     2.  Hypertension  -- BP remains adequately controlled     3.  Morbid obesity, BMI 45  -- Weight loss advised    Follow Up:   Return if symptoms worsen or fail to improve.      Thank you for allowing me to participate in the care of your patient. Please to not hesitate to contact me with additional questions or concerns.     EUGENIA Urbina MD  Interventional Cardiology   04/10/2025  14:00 EDT

## 2025-04-11 PROCEDURE — 93000 ELECTROCARDIOGRAM COMPLETE: CPT | Performed by: INTERNAL MEDICINE

## 2025-08-13 DIAGNOSIS — B00.9 HSV-1 INFECTION: ICD-10-CM

## 2025-08-13 RX ORDER — ACYCLOVIR 400 MG/1
400 TABLET ORAL 2 TIMES DAILY
Qty: 180 TABLET | Refills: 0 | Status: SHIPPED | OUTPATIENT
Start: 2025-08-13